# Patient Record
Sex: FEMALE | Race: WHITE | Employment: UNEMPLOYED | URBAN - METROPOLITAN AREA
[De-identification: names, ages, dates, MRNs, and addresses within clinical notes are randomized per-mention and may not be internally consistent; named-entity substitution may affect disease eponyms.]

---

## 2017-02-16 PROBLEM — Z78.0 POSTMENOPAUSAL: Status: ACTIVE | Noted: 2017-02-16

## 2017-12-06 PROBLEM — R93.5 ABNORMAL CT OF THE ABDOMEN: Status: ACTIVE | Noted: 2017-12-06

## 2017-12-11 ENCOUNTER — OFFICE VISIT (OUTPATIENT)
Dept: GASTROENTEROLOGY | Age: 62
End: 2017-12-11
Payer: MEDICARE

## 2017-12-11 VITALS
SYSTOLIC BLOOD PRESSURE: 135 MMHG | OXYGEN SATURATION: 98 % | HEIGHT: 66 IN | HEART RATE: 83 BPM | DIASTOLIC BLOOD PRESSURE: 81 MMHG | WEIGHT: 165.8 LBS | BODY MASS INDEX: 26.65 KG/M2 | TEMPERATURE: 97.8 F

## 2017-12-11 DIAGNOSIS — R93.5 ABNORMAL CT OF THE ABDOMEN: Primary | ICD-10-CM

## 2017-12-11 DIAGNOSIS — R11.2 NAUSEA AND VOMITING, INTRACTABILITY OF VOMITING NOT SPECIFIED, UNSPECIFIED VOMITING TYPE: ICD-10-CM

## 2017-12-11 PROCEDURE — 99204 OFFICE O/P NEW MOD 45 MIN: CPT | Performed by: INTERNAL MEDICINE

## 2017-12-11 ASSESSMENT — ENCOUNTER SYMPTOMS
TROUBLE SWALLOWING: 0
WHEEZING: 0
CONSTIPATION: 0
SINUS PRESSURE: 0
VOICE CHANGE: 0
SORE THROAT: 0
BLOOD IN STOOL: 0
VOMITING: 1
SINUS PAIN: 1
RHINORRHEA: 0
ABDOMINAL PAIN: 1
BACK PAIN: 1
SHORTNESS OF BREATH: 0
COUGH: 0
ANAL BLEEDING: 0
ABDOMINAL DISTENTION: 0
RECTAL PAIN: 0
DIARRHEA: 1
NAUSEA: 1
FACIAL SWELLING: 0

## 2017-12-11 NOTE — PROGRESS NOTES
Subjective:      Patient ID: Melba Garcia is a 58 y.o. female. HPI   Dr. Adenike Silva MD has requested that I see Melba Garcia for a consult for   1. Abnormal CT of the abdomen    2. Nausea and vomiting, intractability of vomiting not specified, unspecified vomiting type     . Patient seen with the symptoms of abdominal discomfort, diarrhea, nausea and vomiting. Patient's symptoms started suddenly about a month ago. She had mild abdominal bloating, and generalized cramps. Following that she had profuse diarrhea. Also had persistent nausea and vomiting. As the symptoms persisted she visited emergency department at [facility].  Patient had a workup done and her WBC count was [place] [address]. CT scan revealed significant inflammation in the ileum. Patient was prescribed antibiotics. Patient finished the therapy about 3 weeks ago. At that time, the symptoms lasted over a period of 3-4 days. In the last 3 weeks patient is asymptomatic. She is tolerating diet well. No nausea vomiting. No fever or chills. Overall she is doing well. No weight loss. No prior history of similar symptoms. No family history of limited bowel disease. Patient stated that she had a colonoscopy done  few years ago and that was normal.  And I do not have details regarding this. Past Medical, Family, and Social History reviewed and does contribute to the patient presenting condition. patient\"s PMH/PSH,SH,PSYCH hx, MEDs, ALLERGIES, and ROS was all reviewed and updated ion the appropriate sections      Review of Systems   Constitutional: Negative. Negative for activity change, appetite change, chills, diaphoresis, fatigue, fever and unexpected weight change. HENT: Positive for postnasal drip and sinus pain.  Negative for congestion, dental problem, drooling, ear discharge, ear pain, facial swelling, hearing loss, mouth sores, nosebleeds, rhinorrhea, sinus pressure, sneezing, sore sounds normal. No respiratory distress. She has no wheezes. She has no rales. Abdominal: Soft. Bowel sounds are normal. She exhibits no distension, no ascites and no mass. There is no hepatomegaly. There is no tenderness. There is no rebound. No hernia. Genitourinary: Rectal exam shows guaiac negative stool. Musculoskeletal: She exhibits no edema or tenderness. No joint swelling   Lymphadenopathy:     She has no cervical adenopathy. Neurological: She is alert and oriented to person, place, and time. No cranial nerve deficit. Skin: Skin is warm. No bruising, no ecchymosis and no rash noted. No erythema. Psychiatric: Thought content normal.   Nursing note and vitals reviewed. Assessment:      1. Abnormal CT of the abdomen     2. Nausea and vomiting, intractability of vomiting not specified, unspecified vomiting type       Ileitis=? secondary to inflammation or crohns      Plan:      anastacia try to get colonoscopy report. Pt to contact me in 2 to 3 days regarding advice. If she develops , pain , nausea , etc contact me    If she does well , may jose luis for few months and r reevaluate. if any further issues needs colonoscopy    Discussed various possibilities and reassured.     To contact me in couple of days

## 2017-12-20 ENCOUNTER — TELEPHONE (OUTPATIENT)
Dept: GASTROENTEROLOGY | Age: 62
End: 2017-12-20

## 2017-12-20 DIAGNOSIS — R93.5 ABNORMAL CT OF THE ABDOMEN: Primary | ICD-10-CM

## 2017-12-20 NOTE — TELEPHONE ENCOUNTER
Patient calls, the colon report is found from 2011, her last name was Tricia Bates joaquina. Patient states that she feels fine, no pain or nausea , she just has loose stools it started in November. Will discuss with Dr Isaiah Simmons then call her back.

## 2017-12-20 NOTE — TELEPHONE ENCOUNTER
Dr Daniella Lyman reviews the colonoscopy report from 2011 and his note then schedules colonoscopy. Call patient and inform her, she is provided with the number for scheduling. Aramis Cordoba, please call patient and schedule.   Thanks

## 2017-12-21 RX ORDER — POLYETHYLENE GLYCOL 3350 17 G/17G
POWDER, FOR SOLUTION ORAL
Qty: 255 G | Refills: 0 | Status: SHIPPED | OUTPATIENT
Start: 2017-12-21 | End: 2018-01-20

## 2017-12-29 ENCOUNTER — HOSPITAL ENCOUNTER (OUTPATIENT)
Age: 62
Setting detail: OUTPATIENT SURGERY
Discharge: HOME | End: 2017-12-29
Attending: INTERNAL MEDICINE | Admitting: INTERNAL MEDICINE
Payer: MEDICARE

## 2017-12-29 VITALS
WEIGHT: 167 LBS | SYSTOLIC BLOOD PRESSURE: 118 MMHG | OXYGEN SATURATION: 99 % | DIASTOLIC BLOOD PRESSURE: 64 MMHG | BODY MASS INDEX: 26.84 KG/M2 | RESPIRATION RATE: 20 BRPM | TEMPERATURE: 98.2 F | HEART RATE: 50 BPM | HEIGHT: 66 IN

## 2017-12-29 PROCEDURE — 99153 MOD SED SAME PHYS/QHP EA: CPT | Performed by: INTERNAL MEDICINE

## 2017-12-29 PROCEDURE — 99152 MOD SED SAME PHYS/QHP 5/>YRS: CPT | Performed by: INTERNAL MEDICINE

## 2017-12-29 PROCEDURE — 88305 TISSUE EXAM BY PATHOLOGIST: CPT

## 2017-12-29 PROCEDURE — 2580000003 HC RX 258: Performed by: INTERNAL MEDICINE

## 2017-12-29 PROCEDURE — 7100000011 HC PHASE II RECOVERY - ADDTL 15 MIN: Performed by: INTERNAL MEDICINE

## 2017-12-29 PROCEDURE — 6360000002 HC RX W HCPCS: Performed by: INTERNAL MEDICINE

## 2017-12-29 PROCEDURE — 3609010300 HC COLONOSCOPY W/BIOPSY SINGLE/MULTIPLE: Performed by: INTERNAL MEDICINE

## 2017-12-29 PROCEDURE — 7100000010 HC PHASE II RECOVERY - FIRST 15 MIN: Performed by: INTERNAL MEDICINE

## 2017-12-29 RX ORDER — MIDAZOLAM HYDROCHLORIDE 1 MG/ML
INJECTION INTRAMUSCULAR; INTRAVENOUS PRN
Status: DISCONTINUED | OUTPATIENT
Start: 2017-12-29 | End: 2017-12-29 | Stop reason: HOSPADM

## 2017-12-29 RX ORDER — SODIUM CHLORIDE 9 MG/ML
INJECTION, SOLUTION INTRAVENOUS CONTINUOUS
Status: DISCONTINUED | OUTPATIENT
Start: 2017-12-29 | End: 2017-12-29 | Stop reason: HOSPADM

## 2017-12-29 RX ORDER — FENTANYL CITRATE 50 UG/ML
INJECTION, SOLUTION INTRAMUSCULAR; INTRAVENOUS PRN
Status: DISCONTINUED | OUTPATIENT
Start: 2017-12-29 | End: 2017-12-29 | Stop reason: HOSPADM

## 2017-12-29 RX ADMIN — SODIUM CHLORIDE: 9 INJECTION, SOLUTION INTRAVENOUS at 11:08

## 2017-12-29 RX ADMIN — SODIUM CHLORIDE: 9 INJECTION, SOLUTION INTRAVENOUS at 08:39

## 2017-12-29 ASSESSMENT — PAIN - FUNCTIONAL ASSESSMENT: PAIN_FUNCTIONAL_ASSESSMENT: 0-10

## 2017-12-29 ASSESSMENT — PAIN SCALES - GENERAL: PAINLEVEL_OUTOF10: 2

## 2017-12-29 NOTE — H&P
Penicillins Hives    Sulfa Antibiotics Hives       No current facility-administered medications on file prior to encounter. Current Outpatient Prescriptions on File Prior to Encounter   Medication Sig Dispense Refill    polyethylene glycol (GLYCOLAX) powder Please dispense with 4 dulcolax tabs with this prescription. Use as directed by following your patient instructions given by office. 255 g 0    rosuvastatin (CRESTOR) 20 MG tablet TAKE ONE TABLET BY MOUTH DAILY 90 tablet 3    lisinopril (PRINIVIL;ZESTRIL) 10 MG tablet TAKE ONE TABLET BY MOUTH DAILY 90 tablet 3    Probiotic Product (PROBIOTIC DAILY) CAPS Take 1 capsule by mouth daily      acetaminophen (TYLENOL) 500 MG tablet Take 500 mg by mouth every 6 hours as needed for Pain      meclizine (ANTIVERT) 25 MG tablet TAKE ONE TABLET BY MOUTH THREE TIMES A DAY AS NEEDED 180 tablet 3    cyclobenzaprine (FLEXERIL) 10 MG tablet       oxyCODONE-acetaminophen (PERCOCET) 5-325 MG per tablet Take 1 tablet by mouth every 4 hours as needed for Pain .  ibuprofen (ADVIL;MOTRIN) 800 MG tablet       ALPRAZolam (XANAX) 0.25 MG tablet Take 0.25 mg by mouth 2 times daily as needed for Sleep          General health:  Fairly good. No fever or chills. Skin:  No itching, redness or rash. HEENT:  No headache, epistaxis or sore throat. Neck:  No pain, stiffness or masses. Cardiovascular/Respiratory system:  No chest pain, palpitation or shortness of breath. Gastrointestinal tract: No abdominal pain, nausea, vomiting, diarrhea or constipation. Heartburn. Genitourinary:  No burning on micturition. No hesitancy, urgency, frequency or discoloration of urine. Locomotor:  No bone or joint pains. No swelling. Neuropsychiatric:  No referable complaints. Anxiety. See HPI.     GENERAL PHYSICAL EXAM:     Vitals: /68   Pulse 66 Temp 97.9 °F (36.6 °C) (Oral)   Resp 18   Ht 5' 6\" (1.676 m)   Wt 167 lb (75.8 kg)   SpO2 98%   BMI 26.95 kg/m²  Body mass index is 26.95 kg/m². GENERAL APPEARANCE:   Yanely Stacy is 58 y.o.,  female, not obese, nourished, conscious, alert. Does not appear to be distress or pain at this time. SKIN:  Warm, dry, no cyanosis or jaundice. HEAD:  Normocephalic, atraumatic, no swelling or tenderness. EYES:  Pupils equal, reactive to light. EARS:  No discharge, no marked hearing loss. NOSE:  No rhinorrhea, epistaxis or septal deformity. THROAT:  Not congested. No ulceration bleeding or discharge. NECK:  No stiffness, trachea central.  No palpable masses or L.N.                 CHEST:  Symmetrical and equal on expansion. HEART:  RRR S1 > S2. No audible murmurs or gallops. LUNGS:  Equal on expansion, normal breath sounds. No adventitious sounds. ABDOMEN:  Soft on palpation. No localized tenderness. No guarding or rigidity. No palpable organomegaly. LYMPHATICS:  No palpable cervical lymphadenopathy. LOCOMOTOR, BACK AND SPINE:  No tenderness or deformities. EXTREMITIES:  Symmetrical, no pedal edema. Angels sign negative. No discoloration or ulcerations. NEUROLOGIC:  The patient is conscious, alert, oriented, No apparent focal sensory or motor deficits.                                                                                      PROVISIONAL DIAGNOSES / SURGERY:      Colonoscopy      Patient Active Problem List    Diagnosis Date Noted    Nausea & vomiting 12/11/2017    Abnormal CT of the abdomen 12/06/2017    Postmenopausal 02/16/2017    Weight loss counseling, encounter for 12/09/2016    Acute non-recurrent pansinusitis 10/06/2016    Acute cystitis without hematuria 04/07/2016    Major depression, chronic 08/27/2015    Screening for breast cancer 08/27/2015    Hypertension, benign 07/23/2015    Tobacco use disorder, continuous 07/23/2015    Hyperlipidemia with target LDL less than 100 07/23/2015    Chronic pain syndrome 07/23/2015    Generalized anxiety disorder 07/23/2015         ASA 2, Mallampati 2  ROMERO ANGUIANO NP on 12/29/2017 at 9:04 AM

## 2017-12-29 NOTE — OP NOTE
COLONOSCOPY    DATE OF PROCEDURE: 12/29/2017    SURGEON: Kathy Elena MD    ASSISTANT: None    PREOPERATIVE DIAGNOSIS: History of abdominal pain, loose bowels, CT evidence of ileitis. This procedure is performed to evaluate ileitis and colonic pathology    POSTOPERATIVE DIAGNOSIS: Few small diverticuli. No ileitis. No signs of colitis. OPERATION: Total colonoscopy And random biopsies. ANESTHESIA: Moderate Sedation    ESTIMATED BLOOD LOSS: None    COMPLICATIONS: None     SPECIMENS:  Was Obtained: Random colon biopsies to evaluate microscopic colitis    HISTORY: The patient is a 58y.o. year old female with history of above preop diagnosis. I recommended colonoscopy with possible biopsy or polypectomy and I explained the risk, benefits, expected outcome, and alternatives to the procedure. Risks included but are not limited to bleeding, infection, respiratory distress, hypotension, and perforation of the colon and possibility of missing a lesion. The patient understands and is in agreement. PROCEDURE: The patient was given IV conscious sedation. The patient's SPO2 remained above 90% throughout the procedure. Digital rectal exam was normal.  The colonoscope was inserted through the anus into the rectum and advanced under direct vision to the cecum without difficulty. Terminal ileum was examined for approximately 2 inches. The prep was good. yp  Findings:  Terminal ileum: normal For about 4-5 inches. Cecum/Ascending colon: normal    Transverse colon: normal    Descending/Sigmoid colon: normal, Has a few small diverticuli. Rectum/Anus: examined in normal and retroflexed positions and was normal,    Withdrawal Time was (minutes): 10    Random biopsies taken from the right, transverse, left colon to rule out microscopic disease      The colon was decompressed and the scope was removed. The patient tolerated the procedure well without complications. Recommendations/Plan:   1.  F/U

## 2018-01-02 LAB — SURGICAL PATHOLOGY REPORT: NORMAL

## 2018-01-23 DIAGNOSIS — R93.5 ABNORMAL CT OF THE ABDOMEN: ICD-10-CM

## 2018-07-16 PROBLEM — K21.9 GASTROESOPHAGEAL REFLUX DISEASE WITHOUT ESOPHAGITIS: Status: ACTIVE | Noted: 2018-07-16

## 2018-09-17 PROBLEM — R11.2 NAUSEA & VOMITING: Status: RESOLVED | Noted: 2017-12-11 | Resolved: 2018-09-17

## 2018-09-17 PROBLEM — K21.00 GASTROESOPHAGEAL REFLUX DISEASE WITH ESOPHAGITIS: Status: ACTIVE | Noted: 2018-09-17

## 2023-06-16 DIAGNOSIS — E78.5 HYPERLIPIDEMIA WITH TARGET LDL LESS THAN 100: Chronic | ICD-10-CM

## 2023-06-16 DIAGNOSIS — I10 HYPERTENSION, BENIGN: Chronic | ICD-10-CM

## 2023-06-19 RX ORDER — ROSUVASTATIN CALCIUM 20 MG/1
TABLET, COATED ORAL
Qty: 90 TABLET | Refills: 1 | Status: SHIPPED | OUTPATIENT
Start: 2023-06-19

## 2023-06-19 RX ORDER — LISINOPRIL 20 MG/1
TABLET ORAL
Qty: 90 TABLET | Refills: 1 | Status: SHIPPED | OUTPATIENT
Start: 2023-06-19

## 2023-06-28 RX ORDER — ALBUTEROL SULFATE 90 UG/1
1 AEROSOL, METERED RESPIRATORY (INHALATION) EVERY 4 HOURS PRN
Qty: 3 G | Refills: 3 | Status: SHIPPED | OUTPATIENT
Start: 2023-06-28

## 2023-06-28 RX ORDER — MECLIZINE HYDROCHLORIDE 25 MG/1
25 TABLET ORAL 3 TIMES DAILY PRN
Qty: 180 TABLET | Refills: 0 | Status: SHIPPED | OUTPATIENT
Start: 2023-06-28

## 2023-07-13 NOTE — TELEPHONE ENCOUNTER
Patient called and stated that she called last month for a refill on these medication and she gave the wrong mail order company.  They need to go to [facility]

## 2023-07-16 RX ORDER — ALBUTEROL SULFATE 90 UG/1
1 AEROSOL, METERED RESPIRATORY (INHALATION) EVERY 4 HOURS PRN
Qty: 3 G | Refills: 3 | Status: SHIPPED | OUTPATIENT
Start: 2023-07-16

## 2023-07-16 RX ORDER — MECLIZINE HYDROCHLORIDE 25 MG/1
25 TABLET ORAL 3 TIMES DAILY PRN
Qty: 180 TABLET | Refills: 0 | Status: SHIPPED | OUTPATIENT
Start: 2023-07-16

## 2023-07-26 ENCOUNTER — APPOINTMENT (OUTPATIENT)
Dept: CT IMAGING | Age: 68
End: 2023-07-26
Payer: MEDICARE

## 2023-07-26 ENCOUNTER — HOSPITAL ENCOUNTER (EMERGENCY)
Age: 68
Discharge: HOME | End: 2023-07-26
Attending: EMERGENCY MEDICINE
Payer: MEDICARE

## 2023-07-26 ENCOUNTER — APPOINTMENT (OUTPATIENT)
Dept: GENERAL RADIOLOGY | Age: 68
End: 2023-07-26
Payer: MEDICARE

## 2023-07-26 VITALS
OXYGEN SATURATION: 93 % | BODY MASS INDEX: 29.25 KG/M2 | RESPIRATION RATE: 16 BRPM | WEIGHT: 182 LBS | SYSTOLIC BLOOD PRESSURE: 102 MMHG | TEMPERATURE: 98.1 F | HEART RATE: 54 BPM | DIASTOLIC BLOOD PRESSURE: 62 MMHG | HEIGHT: 66 IN

## 2023-07-26 DIAGNOSIS — D32.9 MENINGIOMA (HCC): ICD-10-CM

## 2023-07-26 DIAGNOSIS — R00.2 PALPITATIONS: Primary | ICD-10-CM

## 2023-07-26 LAB
ANION GAP SERPL CALCULATED.3IONS-SCNC: 11 MMOL/L (ref 9–17)
BASOPHILS # BLD: 0.1 K/UL (ref 0–0.2)
BASOPHILS NFR BLD: 1 % (ref 0–2)
BUN SERPL-MCNC: 13 MG/DL (ref 8–23)
CALCIUM SERPL-MCNC: 9.6 MG/DL (ref 8.6–10.4)
CHLORIDE SERPL-SCNC: 100 MMOL/L (ref 98–107)
CO2 SERPL-SCNC: 29 MMOL/L (ref 20–31)
CREAT SERPL-MCNC: 0.9 MG/DL (ref 0.5–0.9)
EOSINOPHIL # BLD: 0.1 K/UL (ref 0–0.4)
EOSINOPHILS RELATIVE PERCENT: 1 % (ref 1–4)
ERYTHROCYTE [DISTWIDTH] IN BLOOD BY AUTOMATED COUNT: 14.8 % (ref 12.5–15.4)
GFR SERPL CREATININE-BSD FRML MDRD: >60 ML/MIN/1.73M2
GLUCOSE SERPL-MCNC: 100 MG/DL (ref 70–99)
HCT VFR BLD AUTO: 42.9 % (ref 36–46)
HGB BLD-MCNC: 14.1 G/DL (ref 12–16)
INR PPP: 0.9
LYMPHOCYTES NFR BLD: 3.4 K/UL (ref 1–4.8)
LYMPHOCYTES RELATIVE PERCENT: 22 % (ref 24–44)
MAGNESIUM SERPL-MCNC: 2.2 MG/DL (ref 1.6–2.6)
MCH RBC QN AUTO: 28.2 PG (ref 26–34)
MCHC RBC AUTO-ENTMCNC: 32.9 G/DL (ref 31–37)
MCV RBC AUTO: 85.6 FL (ref 80–100)
MONOCYTES NFR BLD: 1.1 K/UL (ref 0.1–1.2)
MONOCYTES NFR BLD: 7 % (ref 2–11)
NEUTROPHILS NFR BLD: 69 % (ref 36–66)
NEUTS SEG NFR BLD: 11 K/UL (ref 1.8–7.7)
PARTIAL THROMBOPLASTIN TIME: 21 SEC (ref 21.3–31.3)
PLATELET # BLD AUTO: 289 K/UL (ref 140–450)
PMV BLD AUTO: 6.9 FL (ref 6–12)
POTASSIUM SERPL-SCNC: 3.6 MMOL/L (ref 3.7–5.3)
PROTHROMBIN TIME: 9.4 SEC (ref 9.4–12.6)
RBC # BLD AUTO: 5.01 M/UL (ref 4–5.2)
SODIUM SERPL-SCNC: 140 MMOL/L (ref 135–144)
TROPONIN I SERPL HS-MCNC: 7 NG/L (ref 0–14)
TSH SERPL DL<=0.05 MIU/L-ACNC: 2.02 UIU/ML (ref 0.3–5)
WBC OTHER # BLD: 15.8 K/UL (ref 3.5–11)

## 2023-07-26 PROCEDURE — 36415 COLL VENOUS BLD VENIPUNCTURE: CPT

## 2023-07-26 PROCEDURE — 99285 EMERGENCY DEPT VISIT HI MDM: CPT

## 2023-07-26 PROCEDURE — 83735 ASSAY OF MAGNESIUM: CPT

## 2023-07-26 PROCEDURE — 85730 THROMBOPLASTIN TIME PARTIAL: CPT

## 2023-07-26 PROCEDURE — 93005 ELECTROCARDIOGRAM TRACING: CPT | Performed by: EMERGENCY MEDICINE

## 2023-07-26 PROCEDURE — 85610 PROTHROMBIN TIME: CPT

## 2023-07-26 PROCEDURE — 71046 X-RAY EXAM CHEST 2 VIEWS: CPT

## 2023-07-26 PROCEDURE — 85027 COMPLETE CBC AUTOMATED: CPT

## 2023-07-26 PROCEDURE — 6370000000 HC RX 637 (ALT 250 FOR IP): Performed by: EMERGENCY MEDICINE

## 2023-07-26 PROCEDURE — 84484 ASSAY OF TROPONIN QUANT: CPT

## 2023-07-26 PROCEDURE — 70450 CT HEAD/BRAIN W/O DYE: CPT

## 2023-07-26 PROCEDURE — 84443 ASSAY THYROID STIM HORMONE: CPT

## 2023-07-26 PROCEDURE — 80048 BASIC METABOLIC PNL TOTAL CA: CPT

## 2023-07-26 RX ORDER — POTASSIUM CHLORIDE 20 MEQ/1
20 TABLET, EXTENDED RELEASE ORAL ONCE
Status: DISCONTINUED | OUTPATIENT
Start: 2023-07-26 | End: 2023-07-26

## 2023-07-26 RX ADMIN — POTASSIUM BICARBONATE 20 MEQ: 782 TABLET, EFFERVESCENT ORAL at 14:18

## 2023-07-26 ASSESSMENT — LIFESTYLE VARIABLES
HOW MANY STANDARD DRINKS CONTAINING ALCOHOL DO YOU HAVE ON A TYPICAL DAY: PATIENT DOES NOT DRINK
HOW OFTEN DO YOU HAVE A DRINK CONTAINING ALCOHOL: NEVER

## 2023-07-26 ASSESSMENT — ENCOUNTER SYMPTOMS
NAUSEA: 0
CHEST TIGHTNESS: 0
COUGH: 0
DIARRHEA: 0
EYE REDNESS: 0
VOMITING: 0
SHORTNESS OF BREATH: 0
ABDOMINAL PAIN: 0
CONSTIPATION: 0

## 2023-07-26 ASSESSMENT — PAIN - FUNCTIONAL ASSESSMENT: PAIN_FUNCTIONAL_ASSESSMENT: 0-10

## 2023-07-26 NOTE — DISCHARGE INSTRUCTIONS
Please follow up with your primary care provider within the next few days. If you do not have one, we have attached the [facility] Same Day\" Physician line for you to call and they can provide you with one ([phone]). Please return to the ED if any of your symptoms worsen. If you had imaging today, your results are:  XR CHEST (2 VW)   Final Result   No acute cardiopulmonary disease. CT HEAD WO CONTRAST   Final Result   No acute intracranial abnormality. 5 x 25 mm calcified meningioma along the left frontotemporal convexity,   without associated mass effect.

## 2023-07-27 LAB
EKG ATRIAL RATE: 65 BPM
EKG P AXIS: 65 DEGREES
EKG P-R INTERVAL: 152 MS
EKG Q-T INTERVAL: 394 MS
EKG QRS DURATION: 98 MS
EKG QTC CALCULATION (BAZETT): 409 MS
EKG R AXIS: 41 DEGREES
EKG T AXIS: 61 DEGREES
EKG VENTRICULAR RATE: 65 BPM

## 2023-08-12 RX ORDER — ASCORBIC ACID 500 MG
TABLET ORAL
COMMUNITY

## 2023-08-12 RX ORDER — FLUTICASONE PROPIONATE 50 MCG
SPRAY, SUSPENSION (ML) NASAL
COMMUNITY
Start: 2023-07-15

## 2023-08-12 RX ORDER — NICOTINE 21 MG/24HR
PATCH, TRANSDERMAL 24 HOURS TRANSDERMAL
COMMUNITY
Start: 2021-11-10 | End: 2023-08-12 | Stop reason: CLARIF

## 2023-08-12 RX ORDER — CETIRIZINE HYDROCHLORIDE 10 MG/1
10 TABLET ORAL DAILY
COMMUNITY
End: 2023-08-12 | Stop reason: CLARIF

## 2023-08-12 RX ORDER — MONTELUKAST SODIUM 10 MG/1
TABLET ORAL
COMMUNITY
Start: 2022-06-29

## 2023-08-12 RX ORDER — BUDESONIDE AND FORMOTEROL FUMARATE DIHYDRATE 80; 4.5 UG/1; UG/1
AEROSOL RESPIRATORY (INHALATION)
COMMUNITY
Start: 2022-05-12

## 2023-08-14 NOTE — PROGRESS NOTES
emphysema ([address])     Tobacco use disorder, continuous 07/23/2015    Vitamin D deficiency        Prior to Admission medications    Medication Sig Start Date End Date Taking? Authorizing Provider   Respiratory Therapy Supplies (NEBULIZER/TUBING/MOUTHPIECE) KIT 1 kit by Does not apply route daily for 1 day 8/15/23 8/18/23 Yes Keri Lizama DO   ascorbic acid (VITAMIN C) 500 MG tablet 1 tablet Orally Daily   Yes Historical Provider, MD   fluticasone (FLONASE) 50 MCG/ACT nasal spray 1 spray by Nasal route daily as needed 7/15/23  Yes Historical Provider, MD   vitamin D (CHOLECALCIFEROL) 25 MCG (1000 UT) TABS tablet Take 1 tablet by mouth daily   Yes Historical Provider, MD   meclizine (ANTIVERT) 25 MG tablet Take 1 tablet by mouth 3 times daily as needed for Dizziness 7/16/23  Yes Keri iLzama DO   albuterol sulfate HFA (PROVENTIL HFA) 108 (90 Base) MCG/ACT inhaler Inhale 1 puff into the lungs every 4 hours as needed for Wheezing 7/16/23  Yes Keri Lizama DO   rosuvastatin (CRESTOR) 20 MG tablet TAKE 1 TABLET BY MOUTH ONCE DAILY 6/19/23  Yes Kellen Hurtado DO   lisinopril (PRINIVIL;ZESTRIL) 20 MG tablet TAKE 1 TABLET BY MOUTH ONCE DAILY 6/19/23  Yes Kellen Hurtado DO   esomeprazole Magnesium (NEXIUM) 20 MG PACK Take 1 packet by mouth daily    Historical Provider, MD   triamcinolone (KENALOG) 0.1 % cream Apply topically 2 times daily.  8/25/23   LILI Robles - CNP   metroNIDAZOLE (FLAGYL) 500 MG tablet Take 1 tablet by mouth 3 times daily for 10 days 8/18/23 8/28/23  Mela Saavedra PA-C   ondansetron [facility]) 4 MG tablet Take 1 tablet by mouth every 8 hours as needed for Nausea 8/18/23   Mela Saavedra PA-C   ipratropium 0.5 mg-albuterol 2.5 mg (DUONEB) 0.5-2.5 (3) MG/3ML SOLN nebulizer solution Inhale 3 mLs into the lungs every 4 hours  Patient not taking: Reported on 8/18/2023 8/17/23   Keri [place], DO   Nebulizers (COMPRESSOR/NEBULIZER) MISC 1 each by Does not apply

## 2023-08-15 ENCOUNTER — OFFICE VISIT (OUTPATIENT)
Age: 68
End: 2023-08-15
Payer: MEDICARE

## 2023-08-15 VITALS
DIASTOLIC BLOOD PRESSURE: 64 MMHG | HEART RATE: 72 BPM | HEIGHT: 66 IN | BODY MASS INDEX: 29.41 KG/M2 | SYSTOLIC BLOOD PRESSURE: 127 MMHG | TEMPERATURE: 98.8 F | WEIGHT: 183 LBS | RESPIRATION RATE: 16 BRPM

## 2023-08-15 DIAGNOSIS — E66.3 OVERWEIGHT: ICD-10-CM

## 2023-08-15 DIAGNOSIS — J43.9 PULMONARY EMPHYSEMA, UNSPECIFIED EMPHYSEMA TYPE (HCC): ICD-10-CM

## 2023-08-15 DIAGNOSIS — I10 HYPERTENSION, BENIGN: Primary | ICD-10-CM

## 2023-08-15 DIAGNOSIS — D32.0 MENINGIOMA, CEREBRAL (HCC): ICD-10-CM

## 2023-08-15 DIAGNOSIS — E78.2 MIXED HYPERLIPIDEMIA: ICD-10-CM

## 2023-08-15 DIAGNOSIS — F17.209 TOBACCO USE DISORDER, CONTINUOUS: Chronic | ICD-10-CM

## 2023-08-15 DIAGNOSIS — E78.5 HYPERLIPIDEMIA WITH TARGET LOW DENSITY LIPOPROTEIN (LDL) CHOLESTEROL LESS THAN 100 MG/DL: ICD-10-CM

## 2023-08-15 PROCEDURE — 3078F DIAST BP <80 MM HG: CPT

## 2023-08-15 PROCEDURE — 3074F SYST BP LT 130 MM HG: CPT

## 2023-08-15 PROCEDURE — 99214 OFFICE O/P EST MOD 30 MIN: CPT

## 2023-08-15 PROCEDURE — 1123F ACP DISCUSS/DSCN MKR DOCD: CPT

## 2023-08-15 RX ORDER — NEBULIZER ACCESSORIES
1 KIT MISCELLANEOUS DAILY
Qty: 1 KIT | Refills: 0 | Status: SHIPPED | OUTPATIENT
Start: 2023-08-15 | End: 2023-08-18

## 2023-08-15 RX ORDER — IPRATROPIUM BROMIDE AND ALBUTEROL SULFATE 2.5; .5 MG/3ML; MG/3ML
1 SOLUTION RESPIRATORY (INHALATION) EVERY 4 HOURS
Qty: 360 ML | Refills: 2 | Status: SHIPPED | OUTPATIENT
Start: 2023-08-15 | End: 2023-08-17

## 2023-08-15 RX ORDER — IPRATROPIUM BROMIDE AND ALBUTEROL SULFATE 2.5; .5 MG/3ML; MG/3ML
1 SOLUTION RESPIRATORY (INHALATION) EVERY 4 HOURS
Qty: 360 ML | Refills: 2 | Status: SHIPPED | OUTPATIENT
Start: 2023-08-15 | End: 2023-08-15 | Stop reason: SDUPTHER

## 2023-08-15 RX ORDER — NEBULIZER ACCESSORIES
1 KIT MISCELLANEOUS DAILY PRN
COMMUNITY
End: 2023-08-15 | Stop reason: SDUPTHER

## 2023-08-15 SDOH — ECONOMIC STABILITY: TRANSPORTATION INSECURITY
IN THE PAST 12 MONTHS, HAS THE LACK OF TRANSPORTATION KEPT YOU FROM MEDICAL APPOINTMENTS OR FROM GETTING MEDICATIONS?: NO

## 2023-08-15 SDOH — ECONOMIC STABILITY: HOUSING INSECURITY: IN THE LAST 12 MONTHS, HOW MANY PLACES HAVE YOU LIVED?: 1

## 2023-08-15 SDOH — ECONOMIC STABILITY: HOUSING INSECURITY
IN THE LAST 12 MONTHS, WAS THERE A TIME WHEN YOU DID NOT HAVE A STEADY PLACE TO SLEEP OR SLEPT IN A SHELTER (INCLUDING NOW)?: NO

## 2023-08-15 SDOH — ECONOMIC STABILITY: INCOME INSECURITY: IN THE LAST 12 MONTHS, WAS THERE A TIME WHEN YOU WERE NOT ABLE TO PAY THE MORTGAGE OR RENT ON TIME?: NO

## 2023-08-15 SDOH — ECONOMIC STABILITY: TRANSPORTATION INSECURITY
IN THE PAST 12 MONTHS, HAS LACK OF TRANSPORTATION KEPT YOU FROM MEETINGS, WORK, OR FROM GETTING THINGS NEEDED FOR DAILY LIVING?: NO

## 2023-08-15 SDOH — ECONOMIC STABILITY: FOOD INSECURITY: WITHIN THE PAST 12 MONTHS, YOU WORRIED THAT YOUR FOOD WOULD RUN OUT BEFORE YOU GOT MONEY TO BUY MORE.: NEVER TRUE

## 2023-08-15 SDOH — ECONOMIC STABILITY: FOOD INSECURITY: WITHIN THE PAST 12 MONTHS, THE FOOD YOU BOUGHT JUST DIDN'T LAST AND YOU DIDN'T HAVE MONEY TO GET MORE.: NEVER TRUE

## 2023-08-15 SDOH — ECONOMIC STABILITY: INCOME INSECURITY: HOW HARD IS IT FOR YOU TO PAY FOR THE VERY BASICS LIKE FOOD, HOUSING, MEDICAL CARE, AND HEATING?: NOT HARD AT ALL

## 2023-08-15 ASSESSMENT — ANXIETY QUESTIONNAIRES
4. TROUBLE RELAXING: 2
IF YOU CHECKED OFF ANY PROBLEMS ON THIS QUESTIONNAIRE, HOW DIFFICULT HAVE THESE PROBLEMS MADE IT FOR YOU TO DO YOUR WORK, TAKE CARE OF THINGS AT HOME, OR GET ALONG WITH OTHER PEOPLE: NOT DIFFICULT AT ALL
GAD7 TOTAL SCORE: 8
7. FEELING AFRAID AS IF SOMETHING AWFUL MIGHT HAPPEN: 1
3. WORRYING TOO MUCH ABOUT DIFFERENT THINGS: 2
6. BECOMING EASILY ANNOYED OR IRRITABLE: 0
1. FEELING NERVOUS, ANXIOUS, OR ON EDGE: 1
5. BEING SO RESTLESS THAT IT IS HARD TO SIT STILL: 0
2. NOT BEING ABLE TO STOP OR CONTROL WORRYING: 2

## 2023-08-15 ASSESSMENT — PATIENT HEALTH QUESTIONNAIRE - PHQ9
2. FEELING DOWN, DEPRESSED OR HOPELESS: 1
7. TROUBLE CONCENTRATING ON THINGS, SUCH AS READING THE NEWSPAPER OR WATCHING TELEVISION: 0
SUM OF ALL RESPONSES TO PHQ QUESTIONS 1-9: 3
6. FEELING BAD ABOUT YOURSELF - OR THAT YOU ARE A FAILURE OR HAVE LET YOURSELF OR YOUR FAMILY DOWN: 0
9. THOUGHTS THAT YOU WOULD BE BETTER OFF DEAD, OR OF HURTING YOURSELF: 0
10. IF YOU CHECKED OFF ANY PROBLEMS, HOW DIFFICULT HAVE THESE PROBLEMS MADE IT FOR YOU TO DO YOUR WORK, TAKE CARE OF THINGS AT HOME, OR GET ALONG WITH OTHER PEOPLE: 0
SUM OF ALL RESPONSES TO PHQ QUESTIONS 1-9: 3
4. FEELING TIRED OR HAVING LITTLE ENERGY: 1
SUM OF ALL RESPONSES TO PHQ QUESTIONS 1-9: 3
5. POOR APPETITE OR OVEREATING: 0
8. MOVING OR SPEAKING SO SLOWLY THAT OTHER PEOPLE COULD HAVE NOTICED. OR THE OPPOSITE, BEING SO FIGETY OR RESTLESS THAT YOU HAVE BEEN MOVING AROUND A LOT MORE THAN USUAL: 0
3. TROUBLE FALLING OR STAYING ASLEEP: 1
SUM OF ALL RESPONSES TO PHQ QUESTIONS 1-9: 3

## 2023-08-15 NOTE — PATIENT INSTRUCTIONS
Thank you for following up with us at [facility] outpatient residency clinic. It was a pleasure to see you today. Our plan is the following:  - Referral to Neurosurgery  - will order Duonebs and machine  - Track and record blood pressure daily bring in next visit. - Exercise 4 x a week. Walking okay  - recommend smoking cessation  - Ordered lab work CBC w/ diff, CMP, Fasting lipid    If you have any additional questions or concerns, please call the office ([phone]) and speak to one of the staff. They will triage and forward the message to the doctors. Have a great rest of your day.

## 2023-08-17 ENCOUNTER — TELEPHONE (OUTPATIENT)
Age: 68
End: 2023-08-17

## 2023-08-17 DIAGNOSIS — J44.9 CHRONIC OBSTRUCTIVE PULMONARY DISEASE, UNSPECIFIED COPD TYPE (HCC): Primary | ICD-10-CM

## 2023-08-17 RX ORDER — IPRATROPIUM BROMIDE AND ALBUTEROL SULFATE 2.5; .5 MG/3ML; MG/3ML
1 SOLUTION RESPIRATORY (INHALATION) EVERY 4 HOURS
Qty: 360 ML | Refills: 2 | Status: SHIPPED | OUTPATIENT
Start: 2023-08-17 | End: 2023-09-18

## 2023-08-17 NOTE — TELEPHONE ENCOUNTER
Ipratropium and abuterol were sent to [facility] but meds on are back order. Please send to 90 day supply to [facility]. Also, nebulizer and supplies was sent to [facility] but they do not carry this equipment. Please mail her the script for these and she will take to supply company.

## 2023-08-17 NOTE — TELEPHONE ENCOUNTER
I printed out the prescription for the machine & tubing; it should be mailed out tomorrow. I also sent the nebulizer prescription to [facility] as requested. If you have any questions please don't hesitate to contact us.

## 2023-08-18 ENCOUNTER — HOSPITAL ENCOUNTER (EMERGENCY)
Age: 68
Discharge: HOME | End: 2023-08-18
Attending: EMERGENCY MEDICINE
Payer: MEDICARE

## 2023-08-18 ENCOUNTER — APPOINTMENT (OUTPATIENT)
Dept: CT IMAGING | Age: 68
End: 2023-08-18
Payer: MEDICARE

## 2023-08-18 ENCOUNTER — OFFICE VISIT (OUTPATIENT)
Age: 68
End: 2023-08-18
Payer: MEDICARE

## 2023-08-18 VITALS
WEIGHT: 182.6 LBS | SYSTOLIC BLOOD PRESSURE: 129 MMHG | TEMPERATURE: 99.1 F | BODY MASS INDEX: 29.47 KG/M2 | RESPIRATION RATE: 16 BRPM | DIASTOLIC BLOOD PRESSURE: 60 MMHG | HEART RATE: 88 BPM

## 2023-08-18 VITALS
OXYGEN SATURATION: 97 % | BODY MASS INDEX: 29.25 KG/M2 | HEART RATE: 81 BPM | TEMPERATURE: 98.4 F | WEIGHT: 182 LBS | DIASTOLIC BLOOD PRESSURE: 68 MMHG | HEIGHT: 66 IN | RESPIRATION RATE: 18 BRPM | SYSTOLIC BLOOD PRESSURE: 143 MMHG

## 2023-08-18 DIAGNOSIS — R10.2 SUPRAPUBIC ABDOMINAL PAIN: Primary | ICD-10-CM

## 2023-08-18 DIAGNOSIS — R50.9 FEVER, UNSPECIFIED FEVER CAUSE: ICD-10-CM

## 2023-08-18 DIAGNOSIS — K57.32 DIVERTICULITIS OF COLON: Primary | ICD-10-CM

## 2023-08-18 DIAGNOSIS — R30.0 DYSURIA: ICD-10-CM

## 2023-08-18 DIAGNOSIS — R10.829: ICD-10-CM

## 2023-08-18 DIAGNOSIS — R11.0 NAUSEA: ICD-10-CM

## 2023-08-18 LAB
ALBUMIN SERPL-MCNC: 4.2 G/DL (ref 3.5–5.2)
ALBUMIN/GLOB SERPL: 1.7 {RATIO} (ref 1–2.5)
ALP SERPL-CCNC: 105 U/L (ref 35–104)
ALT SERPL-CCNC: 9 U/L (ref 5–33)
ANION GAP SERPL CALCULATED.3IONS-SCNC: 9 MMOL/L (ref 9–17)
AST SERPL-CCNC: 20 U/L
BASOPHILS # BLD: 0.1 K/UL (ref 0–0.2)
BASOPHILS NFR BLD: 1 % (ref 0–2)
BILIRUB SERPL-MCNC: 0.3 MG/DL (ref 0.3–1.2)
BILIRUB UR QL STRIP: NEGATIVE
BILIRUBIN, POC: NORMAL
BLOOD URINE, POC: NORMAL
BUN SERPL-MCNC: 5 MG/DL (ref 8–23)
CALCIUM SERPL-MCNC: 9 MG/DL (ref 8.6–10.4)
CHLORIDE SERPL-SCNC: 105 MMOL/L (ref 98–107)
CLARITY UR: CLEAR
CLARITY, POC: CLEAR
CO2 SERPL-SCNC: 25 MMOL/L (ref 20–31)
COLOR UR: YELLOW
COLOR, POC: YELLOW
COMMENT: NORMAL
CREAT SERPL-MCNC: 0.7 MG/DL (ref 0.5–0.9)
EOSINOPHIL # BLD: 0.1 K/UL (ref 0–0.4)
EOSINOPHILS RELATIVE PERCENT: 1 % (ref 1–4)
ERYTHROCYTE [DISTWIDTH] IN BLOOD BY AUTOMATED COUNT: 14.6 % (ref 12.5–15.4)
GFR SERPL CREATININE-BSD FRML MDRD: >60 ML/MIN/1.73M2
GLUCOSE SERPL-MCNC: 117 MG/DL (ref 70–99)
GLUCOSE UR STRIP-MCNC: NEGATIVE MG/DL
GLUCOSE URINE, POC: NORMAL
HCT VFR BLD AUTO: 38.7 % (ref 36–46)
HGB BLD-MCNC: 12.7 G/DL (ref 12–16)
HGB UR QL STRIP.AUTO: NEGATIVE
KETONES UR STRIP-MCNC: NEGATIVE MG/DL
KETONES, POC: NORMAL
LEUKOCYTE EST, POC: NORMAL
LEUKOCYTE ESTERASE UR QL STRIP: NEGATIVE
LYMPHOCYTES NFR BLD: 1.9 K/UL (ref 1–4.8)
LYMPHOCYTES RELATIVE PERCENT: 18 % (ref 24–44)
MAGNESIUM SERPL-MCNC: 1.8 MG/DL (ref 1.6–2.6)
MCH RBC QN AUTO: 28.1 PG (ref 26–34)
MCHC RBC AUTO-ENTMCNC: 32.9 G/DL (ref 31–37)
MCV RBC AUTO: 85.4 FL (ref 80–100)
MONOCYTES NFR BLD: 0.8 K/UL (ref 0.1–1.2)
MONOCYTES NFR BLD: 8 % (ref 2–11)
NEUTROPHILS NFR BLD: 72 % (ref 36–66)
NEUTS SEG NFR BLD: 7.7 K/UL (ref 1.8–7.7)
NITRITE UR QL STRIP: NEGATIVE
NITRITE, POC: NORMAL
PH UR STRIP: 6 [PH] (ref 5–8)
PH, POC: 6
PLATELET # BLD AUTO: 261 K/UL (ref 140–450)
PMV BLD AUTO: 7.3 FL (ref 6–12)
POTASSIUM SERPL-SCNC: 3.3 MMOL/L (ref 3.7–5.3)
PROT SERPL-MCNC: 6.7 G/DL (ref 6.4–8.3)
PROT UR STRIP-MCNC: NEGATIVE MG/DL
PROTEIN, POC: NORMAL
RBC # BLD AUTO: 4.53 M/UL (ref 4–5.2)
SODIUM SERPL-SCNC: 139 MMOL/L (ref 135–144)
SP GR UR STRIP: 1.01 (ref 1–1.03)
SPECIFIC GRAVITY, POC: 1.01
UROBILINOGEN UR STRIP-ACNC: NORMAL EU/DL (ref 0–1)
UROBILINOGEN, POC: 0.2
WBC OTHER # BLD: 10.5 K/UL (ref 3.5–11)

## 2023-08-18 PROCEDURE — 81003 URINALYSIS AUTO W/O SCOPE: CPT

## 2023-08-18 PROCEDURE — 1123F ACP DISCUSS/DSCN MKR DOCD: CPT | Performed by: FAMILY MEDICINE

## 2023-08-18 PROCEDURE — 99284 EMERGENCY DEPT VISIT MOD MDM: CPT

## 2023-08-18 PROCEDURE — 99214 OFFICE O/P EST MOD 30 MIN: CPT | Performed by: FAMILY MEDICINE

## 2023-08-18 PROCEDURE — 81003 URINALYSIS AUTO W/O SCOPE: CPT | Performed by: FAMILY MEDICINE

## 2023-08-18 PROCEDURE — 3074F SYST BP LT 130 MM HG: CPT | Performed by: FAMILY MEDICINE

## 2023-08-18 PROCEDURE — 3078F DIAST BP <80 MM HG: CPT | Performed by: FAMILY MEDICINE

## 2023-08-18 PROCEDURE — 74176 CT ABD & PELVIS W/O CONTRAST: CPT

## 2023-08-18 PROCEDURE — 2580000003 HC RX 258: Performed by: PHYSICIAN ASSISTANT

## 2023-08-18 PROCEDURE — 85025 COMPLETE CBC W/AUTO DIFF WBC: CPT

## 2023-08-18 PROCEDURE — 80053 COMPREHEN METABOLIC PANEL: CPT

## 2023-08-18 PROCEDURE — 6370000000 HC RX 637 (ALT 250 FOR IP): Performed by: PHYSICIAN ASSISTANT

## 2023-08-18 PROCEDURE — 83735 ASSAY OF MAGNESIUM: CPT

## 2023-08-18 PROCEDURE — 36415 COLL VENOUS BLD VENIPUNCTURE: CPT

## 2023-08-18 RX ORDER — ONDANSETRON 2 MG/ML
4 INJECTION INTRAMUSCULAR; INTRAVENOUS ONCE
Status: DISCONTINUED | OUTPATIENT
Start: 2023-08-18 | End: 2023-08-18 | Stop reason: HOSPADM

## 2023-08-18 RX ORDER — SULFAMETHOXAZOLE AND TRIMETHOPRIM 800; 160 MG/1; MG/1
1 TABLET ORAL ONCE
Status: COMPLETED | OUTPATIENT
Start: 2023-08-18 | End: 2023-08-18

## 2023-08-18 RX ORDER — SULFAMETHOXAZOLE AND TRIMETHOPRIM 800; 160 MG/1; MG/1
1 TABLET ORAL 2 TIMES DAILY
Qty: 19 TABLET | Refills: 0 | Status: SHIPPED
Start: 2023-08-18 | End: 2023-08-25 | Stop reason: SINTOL

## 2023-08-18 RX ORDER — METRONIDAZOLE 500 MG/1
500 TABLET ORAL 3 TIMES DAILY
Qty: 29 TABLET | Refills: 0 | Status: SHIPPED | OUTPATIENT
Start: 2023-08-18 | End: 2023-08-28

## 2023-08-18 RX ORDER — ONDANSETRON 4 MG/1
4 TABLET, FILM COATED ORAL EVERY 8 HOURS PRN
Qty: 20 TABLET | Refills: 0 | Status: SHIPPED | OUTPATIENT
Start: 2023-08-18

## 2023-08-18 RX ORDER — 0.9 % SODIUM CHLORIDE 0.9 %
500 INTRAVENOUS SOLUTION INTRAVENOUS ONCE
Status: COMPLETED | OUTPATIENT
Start: 2023-08-18 | End: 2023-08-18

## 2023-08-18 RX ORDER — METRONIDAZOLE 500 MG/1
500 TABLET ORAL ONCE
Status: COMPLETED | OUTPATIENT
Start: 2023-08-18 | End: 2023-08-18

## 2023-08-18 RX ADMIN — SULFAMETHOXAZOLE AND TRIMETHOPRIM 1 TABLET: 800; 160 TABLET ORAL at 20:18

## 2023-08-18 RX ADMIN — METRONIDAZOLE 500 MG: 500 TABLET ORAL at 20:18

## 2023-08-18 RX ADMIN — SODIUM CHLORIDE 500 ML: 9 INJECTION, SOLUTION INTRAVENOUS at 17:47

## 2023-08-18 ASSESSMENT — ENCOUNTER SYMPTOMS
SORE THROAT: 0
EYE REDNESS: 0
DIARRHEA: 1
VOMITING: 0
NAUSEA: 1
COUGH: 0
EYE DISCHARGE: 0
ABDOMINAL PAIN: 1
SHORTNESS OF BREATH: 0
BACK PAIN: 0

## 2023-08-18 ASSESSMENT — PAIN SCALES - GENERAL: PAINLEVEL_OUTOF10: 4

## 2023-08-18 ASSESSMENT — PAIN - FUNCTIONAL ASSESSMENT: PAIN_FUNCTIONAL_ASSESSMENT: 0-10

## 2023-08-18 ASSESSMENT — PAIN DESCRIPTION - LOCATION: LOCATION: ABDOMEN

## 2023-08-18 NOTE — PROGRESS NOTES
female with Hx of  has a past medical history of Anxiety, Back pain, Chronic back pain, Chronic maxillary sinusitis, Depression, Generalized anxiety disorder, GERD (gastroesophageal reflux disease), Hyperlipidemia, Hyperlipidemia LDL goal < 100, Hypertension, Hypertension, benign, Lumbago with sciatica, right side, Major depression, chronic, Osteoporosis, Peripheral polyneuropathy, Pulmonary emphysema ([address]), Tobacco use disorder, continuous, and Vitamin D deficiency. who presents to clinic with due to   Chief Complaint   Patient presents with    Abdominal Pain     Abd pain/low back pain for 2 days 9/10 pt states it feels like her insides are falling out when she urinates. Denies blood in stool or urine. HPI   Patient of Dr. Melanie Rivas.  -Patient here for acute visit for extreme abdominal pain. Patient had intense abdominal pain that started 2 days ago, with aminal pain and pain with urination. Yesterday patient started to have fevers and temperatures. Temperature 101.2 today she took Tylenol at 2:50 PM and is now 99.1 office. Patient states it hurts really bad to urinate and feels like her insides are coming out when urinating. She has pain with leaning forward her abdominal pain goes from front to the back bending forward. She able to describe the pain just says it is a lot of pain 8-9 out of 10. She has never had this type of pain before. She has no flank pain  Patient has history of delivering 1 child vaginally, her bowel movements are normal, has had no vomiting, she is not eating today due to her pain. She normally has no problems with urinating. Urinalysis today was negative for blood, protein, LE or nitrates.    Denies shortness of breath, chest pain, diarrhea, blood in stool, blood in urine, no weakness, positive for severe abdominal pain, fever yesterday and today, nauseated, pain with urination, positive for low back and pelvic pain, no visual changes    Review Of Systems  Review of

## 2023-08-18 NOTE — PATIENT INSTRUCTIONS
Thank you for coming today. It was nice to meet you  -sorry you are not feeling well.  -As discussed please go to the emergency department if your lower abdominal pain evaluated, please do imaging, labs and you may be admitted for treatment/observation.  -Follow-up with our office after your hospital visit  -If you have questions or concerns please call the office.

## 2023-08-21 PROBLEM — E87.6 HYPOKALEMIA: Status: ACTIVE | Noted: 2023-08-18

## 2023-08-25 ENCOUNTER — HOSPITAL ENCOUNTER (EMERGENCY)
Age: 68
Discharge: HOME | End: 2023-08-26
Attending: EMERGENCY MEDICINE
Payer: MEDICARE

## 2023-08-25 VITALS
WEIGHT: 180 LBS | DIASTOLIC BLOOD PRESSURE: 78 MMHG | RESPIRATION RATE: 16 BRPM | OXYGEN SATURATION: 97 % | SYSTOLIC BLOOD PRESSURE: 168 MMHG | TEMPERATURE: 98.2 F | HEIGHT: 66 IN | BODY MASS INDEX: 28.93 KG/M2

## 2023-08-25 DIAGNOSIS — L50.9 URTICARIA: Primary | ICD-10-CM

## 2023-08-25 PROCEDURE — 99283 EMERGENCY DEPT VISIT LOW MDM: CPT | Performed by: EMERGENCY MEDICINE

## 2023-08-25 RX ORDER — ESOMEPRAZOLE MAGNESIUM 20 MG/1
20 FOR SUSPENSION ORAL DAILY
COMMUNITY

## 2023-08-25 RX ORDER — TRIAMCINOLONE ACETONIDE 1 MG/G
CREAM TOPICAL
Qty: 30 G | Refills: 0 | Status: SHIPPED | OUTPATIENT
Start: 2023-08-25

## 2023-08-25 RX ORDER — DIPHENHYDRAMINE HCL 25 MG
25 TABLET ORAL ONCE
Status: COMPLETED | OUTPATIENT
Start: 2023-08-26 | End: 2023-08-26

## 2023-08-25 ASSESSMENT — ENCOUNTER SYMPTOMS
COUGH: 0
SHORTNESS OF BREATH: 0
DIARRHEA: 0
VOMITING: 0
SINUS PAIN: 0
SORE THROAT: 0
NAUSEA: 0
ABDOMINAL PAIN: 0

## 2023-08-25 ASSESSMENT — PAIN SCALES - GENERAL: PAINLEVEL_OUTOF10: 0

## 2023-08-25 ASSESSMENT — PAIN - FUNCTIONAL ASSESSMENT: PAIN_FUNCTIONAL_ASSESSMENT: 0-10

## 2023-08-26 PROCEDURE — 6370000000 HC RX 637 (ALT 250 FOR IP): Performed by: NURSE PRACTITIONER

## 2023-08-26 RX ADMIN — DIPHENHYDRAMINE HYDROCHLORIDE 25 MG: 25 TABLET ORAL at 00:04

## 2023-08-26 NOTE — ED PROVIDER NOTES
[facility] Emergency Department  [address] [address] [facility] [address]  Phone: [phone]  Fax: [phone]        Pt Name: Elizabet Wilson  MRN: [id number]  [address] 1955  Date of evaluation: 8/25/23    [address]       Chief Complaint   Patient presents with    Rash     Pt reports she is on flagyl and bactrim for diverticulitis. Started noticing rash on wrist and hands yesterday, but now spreading under arm and upper legs. Rash is itchy. Denies diff breathing, or swallowing. HISTORY OF PRESENT ILLNESS (Location/Symptom, Timing/Onset, Context/Setting, Quality, Duration, Modifying Factors, Severity)      Elizabet Wilson is a 79 y.o. female with no pertinent PMH who presents to the ED via private auto with concern for allergic reaction. Patient reportedly had onset of symptoms beginning yesterday. Patient has a recent cellulitis. Patient was treated with antibiotics Bactrim, Flagyl. Patient had a suspected allergy to sulfas however due to her additional allergies medication was given. Patient took this for approximate 7 days before symptoms began yesterday. Patient has a rash that is clearly raised, urticarial and itching to the bilateral wrists, eczema and the thighs. There is no difficulty swallowing or breathing. No tongue or lip swelling. Patient reports that her diverticular symptoms have nearly resolved.     PAST MEDICAL / SURGICAL / SOCIAL / FAMILY HISTORY     PMH:  has a past medical history of Anxiety, Back pain, Chronic back pain, Chronic maxillary sinusitis, Depression, Diverticulitis of sigmoid colon, Generalized anxiety disorder, GERD (gastroesophageal reflux disease), Hyperlipidemia, Hyperlipidemia LDL goal < 100, Hypertension, Hypertension, benign, Hypokalemia, Lumbago with sciatica, right side, Major depression, chronic, Osteoporosis, Peripheral polyneuropathy, Pulmonary emphysema ([address]), Tobacco use disorder, continuous, and Vitamin D this note were completed with a voice recognition program.  Efforts were made to edit the dictations but occasionally words aremis-transcribed.)       LILI Gutierrez - CNP  08/25/23 LILI Maravilla CNP  08/26/23 0021

## 2023-08-26 NOTE — ED PROVIDER NOTES
Pt Name: Geovanna Blum  MRN: [id number]  [address] 1955  Date of evaluation: 8/25/23       Geovanna Blum is a 79 y.o. female who presents with Rash (Pt reports she is on flagyl and bactrim for diverticulitis. Started noticing rash on wrist and hands yesterday, but now spreading under arm and upper legs. Rash is itchy. Denies diff breathing, or swallowing. )        Based on the medical record, the care appears appropriate. I was personally available for consultation in the Emergency Department.     Mary Dotson MD  Attending Emergency  Physician        Mary Dotson MD  08/25/23 [id number]

## 2023-08-26 NOTE — DISCHARGE INSTRUCTIONS
Stop Bactrim, continue Flagyl for full duration, take Benadryl, Pepcid as needed for allergic symptoms, use topical steroid cream as prescribed/. Monitor for any worsening signs infection such as tongue swelling, difficult swallowing or breathing. Return to ER with any new or worsened symptoms, follow-up with primary physician.

## 2023-08-28 ENCOUNTER — TELEPHONE (OUTPATIENT)
Age: 68
End: 2023-08-28

## 2023-08-28 PROBLEM — D32.0 MENINGIOMA, CEREBRAL (HCC): Status: ACTIVE | Noted: 2023-08-28

## 2023-08-28 NOTE — ASSESSMENT & PLAN NOTE
Evaluated on 6/8/2017. PFT showed no improvement with bronchodilators. -patient has had only one exacerbation. symbacort and spiriva caused ulcers.   - ordered duoneb w/ tubing

## 2023-08-28 NOTE — ASSESSMENT & PLAN NOTE
recent ER visit, had multiple systolic blood presssure readings in 160s.     highest at home is 130s/70s    - track daily

## 2023-08-28 NOTE — ASSESSMENT & PLAN NOTE
1.5 packs a day for 45 years = 67.5 pack years. - counciled smoking cesation. can worsen anxiety. increased mortality for CAD, CVD, cancer.     - patient was not ready to quit at last office visit.

## 2023-08-28 NOTE — TELEPHONE ENCOUNTER
[address] [address]     Date of Visit:  2023  Patient Name: Isidro Paulson   Patient :  1955     Reason for call:   Diverticulitis follow up      ASSESSMENT/PLAN     Patient noted that she was out of flagyl and still feeling some mild twinges on occasion. She has progressed from a full liquid diet to eating tuna, and white bread, but has avoided eating red meat. She denied having had nausea since stopping the bactrim. Recommended continuing with her limited diet until two days after symptoms resolve. Patient advised to call office if symptoms worsen. If symptoms become more severe, please call us back at [phone]; or if you feel like you are having a life threatening emergency, please call 911 or go to the local ER. Please call us if you have any questions or concerns. Thank you for letting us help you on your health journey today!      Next appointment: 2023        COMMUNICATION:       Electronically signed by Nik Mattson DO on 2023 at 4:32 PM

## 2023-08-28 NOTE — TELEPHONE ENCOUNTER
Had to stop taking the Bactrim due to a reaction. Was in ED this weekend. Asking if she had enough of the bactrim to take care of the diverticulitis. Please advise.

## 2023-09-01 DIAGNOSIS — I10 HYPERTENSION, BENIGN: Chronic | ICD-10-CM

## 2023-09-01 DIAGNOSIS — E78.5 HYPERLIPIDEMIA WITH TARGET LDL LESS THAN 100: Chronic | ICD-10-CM

## 2023-09-04 RX ORDER — ROSUVASTATIN CALCIUM 20 MG/1
20 TABLET, COATED ORAL DAILY
Qty: 90 TABLET | Refills: 1 | Status: SHIPPED | OUTPATIENT
Start: 2023-09-04

## 2023-09-04 RX ORDER — LISINOPRIL 20 MG/1
20 TABLET ORAL DAILY
Qty: 90 TABLET | Refills: 1 | Status: SHIPPED | OUTPATIENT
Start: 2023-09-04

## 2023-10-10 ENCOUNTER — OFFICE VISIT (OUTPATIENT)
Age: 68
End: 2023-10-10
Payer: MEDICARE

## 2023-10-10 VITALS
HEIGHT: 66 IN | SYSTOLIC BLOOD PRESSURE: 114 MMHG | HEART RATE: 77 BPM | DIASTOLIC BLOOD PRESSURE: 72 MMHG | WEIGHT: 173.7 LBS | BODY MASS INDEX: 27.92 KG/M2

## 2023-10-10 DIAGNOSIS — D32.0 MENINGIOMA, CEREBRAL (HCC): Primary | ICD-10-CM

## 2023-10-10 PROCEDURE — 99203 OFFICE O/P NEW LOW 30 MIN: CPT | Performed by: NEUROLOGICAL SURGERY

## 2023-10-10 PROCEDURE — 3074F SYST BP LT 130 MM HG: CPT | Performed by: NEUROLOGICAL SURGERY

## 2023-10-10 PROCEDURE — 3078F DIAST BP <80 MM HG: CPT | Performed by: NEUROLOGICAL SURGERY

## 2023-10-10 PROCEDURE — 1123F ACP DISCUSS/DSCN MKR DOCD: CPT | Performed by: NEUROLOGICAL SURGERY

## 2023-10-10 ASSESSMENT — ENCOUNTER SYMPTOMS
COUGH: 0
WHEEZING: 0
ABDOMINAL PAIN: 0
VOMITING: 0
SHORTNESS OF BREATH: 0
NAUSEA: 0
CONSTIPATION: 0
BACK PAIN: 0

## 2023-10-10 NOTE — PROGRESS NOTES
[facility] NEUROSURGERY  [address] [place] [address] [address] [place], [address] [facility] [address]  Dept: [phone]  Dept Fax: [phone]     Patient:  Edmund Lopez  YOB: 1955  Date: 10/10/23      Chief Complaint   Patient presents with    menigioma           HPI:     Ms. Janette Rascon presents to the office today as a new patient for the evaluation of an incidentally discovered meningioma. A head CT was done to evaluate an episode of sort of generalized malaise with elevated blood pressure. This demonstrated a calcified lesion overlying the left cerebral convexity. She denies any difficulty with headaches. She has not had any altered sensation in the extremities. She has not had any weakness. This seems to be a truly incidental finding. History:     Past Medical History:   Diagnosis Date    Back pain     Chronic back pain     Chronic maxillary sinusitis     Diverticulitis of sigmoid colon     Generalized anxiety disorder 07/23/2015    GERD (gastroesophageal reflux disease)     Hyperlipidemia     Hypertension, benign 07/23/2015    Hypokalemia 08/18/2023    seen on CMP from encounter on8/18/2023    Lumbago with sciatica, right side     Major depression, chronic 08/27/2015    Osteoporosis     Peripheral polyneuropathy     Pulmonary emphysema ([address])     Tobacco use disorder, continuous 07/23/2015    Vitamin D deficiency      Past Surgical History:   Procedure Laterality Date    CHOLECYSTECTOMY  10/2006    COLONOSCOPY  02/04/2011    small hemorrhoids, no lesions seen    COLONOSCOPY N/A 12/29/2017    COLONOSCOPY WITH BIOPSY performed by Zahira Antonio MD at [address]  12/29/2017    Few small diverticuli. No ileitis.   No signs of colitis    SKIN BIOPSY Left 10/27/2020    lower leg  [facility]     Family History   Problem Relation Age of Onset    High Blood Pressure Mother     Heart Disease Mother     Alzheimer's

## 2023-10-10 NOTE — PROGRESS NOTES
Review of Systems   Constitutional:  Negative for chills, fatigue, fever and unexpected weight change. Respiratory:  Negative for cough, shortness of breath and wheezing. Cardiovascular:  Negative for chest pain and palpitations. Gastrointestinal:  Negative for abdominal pain (noted), constipation, nausea and vomiting. Musculoskeletal:  Negative for back pain, joint swelling, neck pain and neck stiffness. Neurological:  Negative for dizziness, numbness and headaches. + abdominal pain. + dizzy spells.  + heartburn

## 2023-12-01 DIAGNOSIS — F17.200 CURRENT SMOKER: ICD-10-CM

## 2023-12-01 DIAGNOSIS — I10 HYPERTENSION, BENIGN: Chronic | ICD-10-CM

## 2023-12-01 DIAGNOSIS — E78.5 HYPERLIPIDEMIA WITH TARGET LDL LESS THAN 100: Primary | Chronic | ICD-10-CM

## 2023-12-07 RX ORDER — LISINOPRIL 20 MG/1
20 TABLET ORAL DAILY
Qty: 100 TABLET | Refills: 2 | Status: SHIPPED | OUTPATIENT
Start: 2023-12-07

## 2023-12-07 RX ORDER — ROSUVASTATIN CALCIUM 20 MG/1
20 TABLET, COATED ORAL DAILY
Qty: 100 TABLET | Refills: 2 | Status: SHIPPED | OUTPATIENT
Start: 2023-12-07

## 2023-12-07 NOTE — TELEPHONE ENCOUNTER
Left patient a message to call the office back, if patient returns our phone call please let her know info above per Dr. Kenneth Handley. Thanks.

## 2023-12-20 ENCOUNTER — OFFICE VISIT (OUTPATIENT)
Age: 68
End: 2023-12-20
Payer: MEDICARE

## 2023-12-20 VITALS
WEIGHT: 176.4 LBS | BODY MASS INDEX: 28.47 KG/M2 | DIASTOLIC BLOOD PRESSURE: 60 MMHG | TEMPERATURE: 70 F | RESPIRATION RATE: 12 BRPM | SYSTOLIC BLOOD PRESSURE: 128 MMHG

## 2023-12-20 DIAGNOSIS — J43.9 PULMONARY EMPHYSEMA, UNSPECIFIED EMPHYSEMA TYPE (HCC): ICD-10-CM

## 2023-12-20 DIAGNOSIS — J44.9 CHRONIC OBSTRUCTIVE PULMONARY DISEASE, UNSPECIFIED COPD TYPE (HCC): ICD-10-CM

## 2023-12-20 DIAGNOSIS — I10 PRIMARY HYPERTENSION: Primary | ICD-10-CM

## 2023-12-20 DIAGNOSIS — E87.6 HYPOKALEMIA: ICD-10-CM

## 2023-12-20 DIAGNOSIS — F17.209 TOBACCO USE DISORDER, CONTINUOUS: Chronic | ICD-10-CM

## 2023-12-20 DIAGNOSIS — E78.2 MIXED HYPERLIPIDEMIA: ICD-10-CM

## 2023-12-20 DIAGNOSIS — R01.1 SYSTOLIC MURMUR: ICD-10-CM

## 2023-12-20 PROCEDURE — 93005 ELECTROCARDIOGRAM TRACING: CPT | Performed by: STUDENT IN AN ORGANIZED HEALTH CARE EDUCATION/TRAINING PROGRAM

## 2023-12-20 PROCEDURE — 99213 OFFICE O/P EST LOW 20 MIN: CPT | Performed by: STUDENT IN AN ORGANIZED HEALTH CARE EDUCATION/TRAINING PROGRAM

## 2023-12-20 RX ORDER — ADHESIVE BANDAGE 3/4"
1 BANDAGE TOPICAL DAILY
Qty: 1 EACH | Refills: 0 | Status: SHIPPED | OUTPATIENT
Start: 2023-12-20

## 2023-12-20 RX ORDER — IPRATROPIUM BROMIDE AND ALBUTEROL SULFATE 2.5; .5 MG/3ML; MG/3ML
1 SOLUTION RESPIRATORY (INHALATION) EVERY 4 HOURS PRN
COMMUNITY
End: 2023-12-20 | Stop reason: SDUPTHER

## 2023-12-20 RX ORDER — IPRATROPIUM BROMIDE AND ALBUTEROL SULFATE 2.5; .5 MG/3ML; MG/3ML
1 SOLUTION RESPIRATORY (INHALATION) EVERY 4 HOURS PRN
Qty: 360 ML | Refills: 1 | Status: SHIPPED | OUTPATIENT
Start: 2023-12-20 | End: 2024-06-17

## 2023-12-20 RX ORDER — NEBULIZER ACCESSORIES
1 KIT MISCELLANEOUS DAILY
Qty: 1 KIT | Refills: 0 | Status: SHIPPED | OUTPATIENT
Start: 2023-12-20 | End: 2023-12-21

## 2023-12-20 NOTE — PROGRESS NOTES
Lymphocytes % 12/19/2023 31  24 - 43 % Final    Monocytes % 12/19/2023 6  3 - 12 % Final    Eosinophils % 12/19/2023 2  1 - 4 % Final    Basophils % 12/19/2023 1  0 - 2 % Final    Immature Granulocytes 12/19/2023 0  0 % Final    Neutrophils Absolute 12/19/2023 4.43  1.50 - 8.10 k/uL Final    Lymphocytes Absolute 12/19/2023 2.30  1.10 - 3.70 k/uL Final    Monocytes Absolute 12/19/2023 0.40  0.10 - 1.20 k/uL Final    Eosinophils Absolute 12/19/2023 0.12  0.00 - 0.44 k/uL Final    Basophils Absolute 12/19/2023 0.05  0.00 - 0.20 k/uL Final    Absolute Immature Granulocyte 12/19/2023 <0.03  0.00 - 0.30 k/uL Final    Sodium 12/19/2023 140  135 - 144 mmol/L Final    Potassium 12/19/2023 4.6  3.7 - 5.3 mmol/L Final    Chloride 12/19/2023 104  98 - 107 mmol/L Final    CO2 12/19/2023 27  20 - 31 mmol/L Final    Anion Gap 12/19/2023 9  9 - 17 mmol/L Final    Glucose 12/19/2023 91  70 - 99 mg/dL Final    BUN 12/19/2023 8  8 - 23 mg/dL Final    Creatinine 12/19/2023 0.7  0.5 - 0.9 mg/dL Final    Est, Glom Filt Rate 12/19/2023 >60  >60 mL/min/1.73m2 Final    Comment:       These results are not intended for use in patients <25years of age. eGFR results are calculated without a race factor using the 2021 CKD-EPI equation. Careful clinical correlation is recommended, particularly when comparing to results   calculated using previous equations. The CKD-EPI equation is less accurate in patients with extremes of muscle mass, extra-renal   metabolism of creatine, excessive creatine ingestion, or following therapy that affects   renal tubular secretion.       Calcium 12/19/2023 9.4  8.6 - 10.4 mg/dL Final    Total Protein 12/19/2023 6.6  6.4 - 8.3 g/dL Final    Albumin 12/19/2023 3.9  3.5 - 5.2 g/dL Final    Albumin/Globulin Ratio 12/19/2023 1.4  1.0 - 2.5 Final    Total Bilirubin 12/19/2023 0.3  0.3 - 1.2 mg/dL Final    Alkaline Phosphatase 12/19/2023 105 (H)  35 - 104 U/L Final    ALT 12/19/2023 6  5 - 33 U/L Final    AST

## 2023-12-20 NOTE — PATIENT INSTRUCTIONS
Thank you for following up with us at [address] [facility]. office! It was a pleasure to meet you today! Our plan is the following:  - I have generated an order for you to get a new blood pressure cuff. Once you get the cuff, I like for you to measure your blood pressure every day at the same time of day. - I like for you to be seen again in the office in 1 month. Bring your blood pressure log with you, or take a photo in case you forget to bring your log with you so that your home BP readings can be discussed at the follow-up appointment. - Because of the new heart murmur, I have ordered an EKG as well as an echo (heart ultrasound). If you can get the echo done before your next appointment, great. If not, you will receive a phone call with the results of the testing once it comes back. - We also discussed your sinus congestion and frequent headaches. Since you feel better when you wake up and you do not usually have a headache, I think these headaches are likely due to sinus congestion. Sleeping with a humidifier in your room is obviously helping her symptoms, so you may consider putting a humidifier in another room to spend a lot of time in. You can also use the nasal mist to help increase the humidity in your nostrils. I know using the Net but using it regularly can help flush outi pot can be uncomfortable, but using it regularly can help flush out the thick mucus and help with some of that discomfort. Your medication list is included in the after visit summary. If you find any differences when compared to your medications at home, please contact the office ([phone]) to let us know. You can also call the office if you have any additional questions or concerns and speak to one of the staff. They will triage and forward the message to the provider. Have a great rest of your day!

## 2023-12-20 NOTE — ASSESSMENT & PLAN NOTE
-  unclear control; home BP monitor reading hypertensive to SBP 160s, but machine reading compared to in office reading is approx. [id number] on systolic reading  - home BP cuff order generated and printed.  Pt will take daily BP readings; follow-up in 1 month with BP log to determine if pt still has adequate BP control

## 2023-12-22 PROBLEM — R01.1 SYSTOLIC MURMUR: Status: ACTIVE | Noted: 2023-12-22

## 2023-12-22 NOTE — ASSESSMENT & PLAN NOTE
- Potassium WNL.   Advised patient hypokalemia diagnosis will likely stay on problem list until several labs have redemonstrated normal potassium

## 2023-12-22 NOTE — ASSESSMENT & PLAN NOTE
- needs nebulizer and tubing order printed. - Also needs DuoNeb nebulizer treatments sent to pharmacy.

## 2023-12-22 NOTE — ASSESSMENT & PLAN NOTE
- EKG done in office, was WNL  - Pt had Echo done in 2017; will order a new Echo for newly diagnosed systolic murmur

## 2023-12-27 ENCOUNTER — TELEPHONE (OUTPATIENT)
Age: 68
End: 2023-12-27

## 2023-12-27 NOTE — TELEPHONE ENCOUNTER
Patient called and stated that her pharmacy is having a hard time trying to find the ipratropium from any pharmacy, so she wanted to know if she can just get a script for the Albuterol solution for her nebulizer.  She would like a 90 day supply sent to [facility]

## 2023-12-29 RX ORDER — ALBUTEROL SULFATE 2.5 MG/3ML
2.5 SOLUTION RESPIRATORY (INHALATION) 4 TIMES DAILY PRN
Qty: 120 EACH | Refills: 0 | Status: SHIPPED | OUTPATIENT
Start: 2023-12-29 | End: 2024-03-28

## 2023-12-31 ENCOUNTER — TELEPHONE (OUTPATIENT)
Age: 68
End: 2023-12-31

## 2023-12-31 DIAGNOSIS — J01.90 ACUTE BACTERIAL RHINOSINUSITIS: Primary | ICD-10-CM

## 2023-12-31 DIAGNOSIS — B96.89 ACUTE BACTERIAL RHINOSINUSITIS: Primary | ICD-10-CM

## 2023-12-31 RX ORDER — DOXYCYCLINE HYCLATE 100 MG
100 TABLET ORAL 2 TIMES DAILY
Qty: 14 TABLET | Refills: 0 | Status: SHIPPED | OUTPATIENT
Start: 2023-12-31 | End: 2024-01-07

## 2024-01-04 ENCOUNTER — HOSPITAL ENCOUNTER (OUTPATIENT)
Age: 69
Discharge: HOME | End: 2024-01-06
Payer: MEDICARE

## 2024-01-04 VITALS — HEIGHT: 66 IN | WEIGHT: 176 LBS | BODY MASS INDEX: 28.28 KG/M2

## 2024-01-04 DIAGNOSIS — I10 PRIMARY HYPERTENSION: ICD-10-CM

## 2024-01-04 DIAGNOSIS — R01.1 SYSTOLIC MURMUR: ICD-10-CM

## 2024-01-04 LAB
ECHO AO ROOT DIAM: 3.2 CM
ECHO AO ROOT INDEX: 1.69 CM/M2
ECHO AV AREA PEAK VELOCITY: 3.1 CM2
ECHO AV AREA VTI: 3.4 CM2
ECHO AV AREA/BSA PEAK VELOCITY: 1.6 CM2/M2
ECHO AV AREA/BSA VTI: 1.8 CM2/M2
ECHO AV MEAN GRADIENT: 4 MMHG
ECHO AV MEAN VELOCITY: 0.9 M/S
ECHO AV PEAK GRADIENT: 6 MMHG
ECHO AV PEAK VELOCITY: 1.3 M/S
ECHO AV VELOCITY RATIO: 1
ECHO AV VTI: 22.4 CM
ECHO BSA: 1.93 M2
ECHO EST RA PRESSURE: 3 MMHG
ECHO IVC EXP: 1.4 CM
ECHO IVC INSP: 0.6 CM
ECHO LA AREA 2C: 13.9 CM2
ECHO LA AREA 4C: 10.9 CM2
ECHO LA DIAMETER INDEX: 1.96 CM/M2
ECHO LA DIAMETER: 3.7 CM
ECHO LA MAJOR AXIS: 4.3 CM
ECHO LA MINOR AXIS: 4.6 CM
ECHO LA TO AORTIC ROOT RATIO: 1.16
ECHO LA VOL BP: 28 ML (ref 22–52)
ECHO LA VOL MOD A2C: 34 ML (ref 22–52)
ECHO LA VOL MOD A4C: 21 ML (ref 22–52)
ECHO LA VOL/BSA BIPLANE: 15 ML/M2 (ref 16–34)
ECHO LA VOLUME INDEX MOD A2C: 18 ML/M2 (ref 16–34)
ECHO LA VOLUME INDEX MOD A4C: 11 ML/M2 (ref 16–34)
ECHO LV E' LATERAL VELOCITY: 12 CM/S
ECHO LV E' SEPTAL VELOCITY: 10 CM/S
ECHO LV FRACTIONAL SHORTENING: 32 % (ref 28–44)
ECHO LV INTERNAL DIMENSION DIASTOLE INDEX: 2.33 CM/M2
ECHO LV INTERNAL DIMENSION DIASTOLIC: 4.4 CM (ref 3.9–5.3)
ECHO LV INTERNAL DIMENSION SYSTOLIC INDEX: 1.59 CM/M2
ECHO LV INTERNAL DIMENSION SYSTOLIC: 3 CM
ECHO LV IVSD: 0.9 CM (ref 0.6–0.9)
ECHO LV MASS 2D: 128 G (ref 67–162)
ECHO LV MASS INDEX 2D: 67.7 G/M2 (ref 43–95)
ECHO LV POSTERIOR WALL DIASTOLIC: 0.9 CM (ref 0.6–0.9)
ECHO LV RELATIVE WALL THICKNESS RATIO: 0.41
ECHO LVOT AREA: 3.1 CM2
ECHO LVOT AV VTI INDEX: 1.09
ECHO LVOT DIAM: 2 CM
ECHO LVOT MEAN GRADIENT: 4 MMHG
ECHO LVOT PEAK GRADIENT: 6 MMHG
ECHO LVOT PEAK VELOCITY: 1.3 M/S
ECHO LVOT STROKE VOLUME INDEX: 40.5 ML/M2
ECHO LVOT SV: 76.6 ML
ECHO LVOT VTI: 24.4 CM
ECHO MV A VELOCITY: 0.83 M/S
ECHO MV E DECELERATION TIME (DT): 156 MS
ECHO MV E VELOCITY: 0.62 M/S
ECHO MV E/A RATIO: 0.75
ECHO MV E/E' LATERAL: 5.17
ECHO MV E/E' RATIO (AVERAGED): 5.68
ECHO RIGHT VENTRICULAR SYSTOLIC PRESSURE (RVSP): 25 MMHG
ECHO RV BASAL DIMENSION: 2.4 CM
ECHO RV FREE WALL PEAK S': 11 CM/S
ECHO TV REGURGITANT MAX VELOCITY: 2.36 M/S
ECHO TV REGURGITANT PEAK GRADIENT: 22 MMHG

## 2024-01-04 PROCEDURE — 93306 TTE W/DOPPLER COMPLETE: CPT

## 2024-01-16 DIAGNOSIS — F40.240 CLAUSTROPHOBIA: Primary | ICD-10-CM

## 2024-01-16 NOTE — TELEPHONE ENCOUNTER
1/16/24 Pt called today - she is getting ready to schedule her brain MRI. Unable to get through MRI without xanax - claustrophobia. She states \"I cannot take valium or that other one (ativan) as I get too loopy and don't tolerate them. I ALWAYS have xanax and it works for me.\"  [facility] [address] in [place].  [place] shows last time she filled any controlled substance was Xanax 0.25 mg #15 was 7/1/2020 at [facility] on [address].  Dr Block please advise on rx.  CARLOS Samuels RN

## 2024-01-18 NOTE — TELEPHONE ENCOUNTER
1/18/24 Discussed w Dr Block - he ordered 1 xanax 0.25 mg to take prior to MRI as needed for claustrophobia.  1:04 pm Called in RX and LM for [facility] on [address]. Then LMOM for pt w this information.  Will send rx back to Dr Block to sign off as this was a phoned in vaibhav Samuels RN

## 2024-01-19 ENCOUNTER — OFFICE VISIT (OUTPATIENT)
Age: 69
End: 2024-01-19
Payer: MEDICARE

## 2024-01-19 VITALS
RESPIRATION RATE: 16 BRPM | SYSTOLIC BLOOD PRESSURE: 115 MMHG | HEIGHT: 66 IN | HEART RATE: 78 BPM | TEMPERATURE: 98.1 F | BODY MASS INDEX: 27.93 KG/M2 | WEIGHT: 173.8 LBS | DIASTOLIC BLOOD PRESSURE: 65 MMHG

## 2024-01-19 DIAGNOSIS — I10 PRIMARY HYPERTENSION: ICD-10-CM

## 2024-01-19 DIAGNOSIS — F41.1 GENERALIZED ANXIETY DISORDER: Primary | Chronic | ICD-10-CM

## 2024-01-19 PROCEDURE — 3074F SYST BP LT 130 MM HG: CPT | Performed by: STUDENT IN AN ORGANIZED HEALTH CARE EDUCATION/TRAINING PROGRAM

## 2024-01-19 PROCEDURE — 3078F DIAST BP <80 MM HG: CPT | Performed by: STUDENT IN AN ORGANIZED HEALTH CARE EDUCATION/TRAINING PROGRAM

## 2024-01-19 PROCEDURE — 99213 OFFICE O/P EST LOW 20 MIN: CPT | Performed by: STUDENT IN AN ORGANIZED HEALTH CARE EDUCATION/TRAINING PROGRAM

## 2024-01-19 PROCEDURE — 1123F ACP DISCUSS/DSCN MKR DOCD: CPT | Performed by: STUDENT IN AN ORGANIZED HEALTH CARE EDUCATION/TRAINING PROGRAM

## 2024-01-19 PROCEDURE — 99213 OFFICE O/P EST LOW 20 MIN: CPT

## 2024-01-19 RX ORDER — ALPRAZOLAM 0.25 MG/1
0.25 TABLET ORAL ONCE
Qty: 1 TABLET | Refills: 0 | OUTPATIENT
Start: 2024-01-19 | End: 2024-01-19

## 2024-01-19 RX ORDER — MECLIZINE HYDROCHLORIDE 25 MG/1
25 TABLET ORAL 3 TIMES DAILY PRN
Qty: 180 TABLET | Refills: 0 | Status: SHIPPED | OUTPATIENT
Start: 2024-01-19 | End: 2024-04-18

## 2024-01-19 ASSESSMENT — PATIENT HEALTH QUESTIONNAIRE - PHQ9
SUM OF ALL RESPONSES TO PHQ QUESTIONS 1-9: 9
1. LITTLE INTEREST OR PLEASURE IN DOING THINGS: 1
SUM OF ALL RESPONSES TO PHQ9 QUESTIONS 1 & 2: 3
SUM OF ALL RESPONSES TO PHQ QUESTIONS 1-9: 9
SUM OF ALL RESPONSES TO PHQ QUESTIONS 1-9: 9
3. TROUBLE FALLING OR STAYING ASLEEP: 3
9. THOUGHTS THAT YOU WOULD BE BETTER OFF DEAD, OR OF HURTING YOURSELF: 0
5. POOR APPETITE OR OVEREATING: 0
10. IF YOU CHECKED OFF ANY PROBLEMS, HOW DIFFICULT HAVE THESE PROBLEMS MADE IT FOR YOU TO DO YOUR WORK, TAKE CARE OF THINGS AT HOME, OR GET ALONG WITH OTHER PEOPLE: 0
8. MOVING OR SPEAKING SO SLOWLY THAT OTHER PEOPLE COULD HAVE NOTICED. OR THE OPPOSITE, BEING SO FIGETY OR RESTLESS THAT YOU HAVE BEEN MOVING AROUND A LOT MORE THAN USUAL: 0
4. FEELING TIRED OR HAVING LITTLE ENERGY: 3
6. FEELING BAD ABOUT YOURSELF - OR THAT YOU ARE A FAILURE OR HAVE LET YOURSELF OR YOUR FAMILY DOWN: 0
7. TROUBLE CONCENTRATING ON THINGS, SUCH AS READING THE NEWSPAPER OR WATCHING TELEVISION: 0
SUM OF ALL RESPONSES TO PHQ QUESTIONS 1-9: 9
2. FEELING DOWN, DEPRESSED OR HOPELESS: 2

## 2024-01-19 NOTE — PROGRESS NOTES
Final    Sodium 12/19/2023 140  135 - 144 mmol/L Final    Potassium 12/19/2023 4.6  3.7 - 5.3 mmol/L Final    Chloride 12/19/2023 104  98 - 107 mmol/L Final    CO2 12/19/2023 27  20 - 31 mmol/L Final    Anion Gap 12/19/2023 9  9 - 17 mmol/L Final    Glucose 12/19/2023 91  70 - 99 mg/dL Final    BUN 12/19/2023 8  8 - 23 mg/dL Final    Creatinine 12/19/2023 0.7  0.5 - 0.9 mg/dL Final    Est, Glom Filt Rate 12/19/2023 >60  >60 mL/min/1.73m2 Final    Comment:       These results are not intended for use in patients <18 years of age.        eGFR results are calculated without a race factor using the 2021 CKD-EPI equation.  Careful clinical correlation is recommended, particularly when comparing to results   calculated using previous equations.  The CKD-EPI equation is less accurate in patients with extremes of muscle mass, extra-renal   metabolism of creatine, excessive creatine ingestion, or following therapy that affects   renal tubular secretion.      Calcium 12/19/2023 9.4  8.6 - 10.4 mg/dL Final    Total Protein 12/19/2023 6.6  6.4 - 8.3 g/dL Final    Albumin 12/19/2023 3.9  3.5 - 5.2 g/dL Final    Albumin/Globulin Ratio 12/19/2023 1.4  1.0 - 2.5 Final    Total Bilirubin 12/19/2023 0.3  0.3 - 1.2 mg/dL Final    Alkaline Phosphatase 12/19/2023 105 (H)  35 - 104 U/L Final    ALT 12/19/2023 6  5 - 33 U/L Final    AST 12/19/2023 15  <32 U/L Final        No results found for this visit on 01/19/24.     COMMUNICATION   Questions/concerns answered. Patient verbalized and expressed understanding. Medications, laboratory testing, imaging, consultation, and follow up as documented in this encounter.     Patient Instructions   Thank you for following up with us at [facility] outpatient residency clinic! It was a pleasure to meet you today!     Our plan is the following:  -Continue taking your medication Lisinopril as it is doing well  - I will continue to follow up with neurology       If you have any

## 2024-01-19 NOTE — PATIENT INSTRUCTIONS
Thank you for following up with us at [facility] outpatient residency clinic! It was a pleasure to meet you today!     Our plan is the following:  -Continue taking your medication Lisinopril as it is doing well  - I will continue to follow up with neurology       If you have any additional questions or concerns, please call the office ([phone]) and speak to one of the staff. They will triage and forward the message to the doctors! Have a great rest of your day!

## 2024-01-22 ENCOUNTER — HOSPITAL ENCOUNTER (OUTPATIENT)
Dept: MRI IMAGING | Age: 69
Discharge: HOME | End: 2024-01-24
Attending: NEUROLOGICAL SURGERY
Payer: MEDICARE

## 2024-01-22 DIAGNOSIS — D32.0 MENINGIOMA, CEREBRAL (HCC): ICD-10-CM

## 2024-01-22 PROCEDURE — 70551 MRI BRAIN STEM W/O DYE: CPT

## 2024-01-25 ENCOUNTER — OFFICE VISIT (OUTPATIENT)
Age: 69
End: 2024-01-25
Payer: MEDICARE

## 2024-01-25 VITALS — SYSTOLIC BLOOD PRESSURE: 123 MMHG | DIASTOLIC BLOOD PRESSURE: 75 MMHG | HEART RATE: 77 BPM

## 2024-01-25 DIAGNOSIS — D32.0 MENINGIOMA, CEREBRAL (HCC): Primary | ICD-10-CM

## 2024-01-25 PROCEDURE — 1123F ACP DISCUSS/DSCN MKR DOCD: CPT | Performed by: NEUROLOGICAL SURGERY

## 2024-01-25 PROCEDURE — 99214 OFFICE O/P EST MOD 30 MIN: CPT | Performed by: NEUROLOGICAL SURGERY

## 2024-01-25 PROCEDURE — 3078F DIAST BP <80 MM HG: CPT | Performed by: NEUROLOGICAL SURGERY

## 2024-01-25 PROCEDURE — 3074F SYST BP LT 130 MM HG: CPT | Performed by: NEUROLOGICAL SURGERY

## 2024-01-26 NOTE — PROGRESS NOTES
appears to be dural based overlying the left temporal lobe.  This is consistent with a meningioma.  The radiologist feels that it appears larger on the MRI compared to the CT.  I think this is possible, but I also think this may be due mostly to the different imaging modality.  Nevertheless, I think it would be prudent to repeat an MRI in about 6 months because of the concern for growth.  I will plan to have her back to the office at that time to review the results.  If it seems to be unchanged I would then plan to transition to annual surveillance imaging.  I think it is unlikely that she will experience any neurologic deficits as a result of this lesion, but if she were to have trouble it would most likely be related to speech.  I instructed her to contact me if she does develop any new problems prior to the planned follow-up.      Electronically signed by Parminder Block MD on 1/26/2024 at 1:01 AM    Please note that this chart was generated using voice recognition Dragon dictation software.  Although every effort was made to ensure the accuracy of this automated transcription, some errors in transcription may have occurred.

## 2024-03-08 ENCOUNTER — APPOINTMENT (OUTPATIENT)
Dept: CT IMAGING | Age: 69
End: 2024-03-08
Payer: MEDICARE

## 2024-03-08 ENCOUNTER — HOSPITAL ENCOUNTER (EMERGENCY)
Age: 69
Discharge: HOME | End: 2024-03-09
Attending: EMERGENCY MEDICINE
Payer: MEDICARE

## 2024-03-08 VITALS
WEIGHT: 172 LBS | HEART RATE: 69 BPM | SYSTOLIC BLOOD PRESSURE: 116 MMHG | TEMPERATURE: 97.9 F | HEIGHT: 66 IN | RESPIRATION RATE: 16 BRPM | OXYGEN SATURATION: 98 % | DIASTOLIC BLOOD PRESSURE: 70 MMHG | BODY MASS INDEX: 27.64 KG/M2

## 2024-03-08 DIAGNOSIS — R19.7 DIARRHEA, UNSPECIFIED TYPE: Primary | ICD-10-CM

## 2024-03-08 LAB
BASOPHILS # BLD: 0 K/UL (ref 0–0.2)
BASOPHILS NFR BLD: 0 % (ref 0–2)
EOSINOPHIL # BLD: 0.2 K/UL (ref 0–0.4)
EOSINOPHILS RELATIVE PERCENT: 2 % (ref 1–4)
ERYTHROCYTE [DISTWIDTH] IN BLOOD BY AUTOMATED COUNT: 14.4 % (ref 12.5–15.4)
HCT VFR BLD AUTO: 39.3 % (ref 36–46)
HGB BLD-MCNC: 13.3 G/DL (ref 12–16)
LYMPHOCYTES NFR BLD: 2.6 K/UL (ref 1–4.8)
LYMPHOCYTES RELATIVE PERCENT: 29 % (ref 24–44)
MCH RBC QN AUTO: 28.9 PG (ref 26–34)
MCHC RBC AUTO-ENTMCNC: 33.9 G/DL (ref 31–37)
MCV RBC AUTO: 85.5 FL (ref 80–100)
MONOCYTES NFR BLD: 0.5 K/UL (ref 0.1–1.2)
MONOCYTES NFR BLD: 6 % (ref 2–11)
NEUTROPHILS NFR BLD: 63 % (ref 36–66)
NEUTS SEG NFR BLD: 5.7 K/UL (ref 1.8–7.7)
PLATELET # BLD AUTO: 260 K/UL (ref 140–450)
PMV BLD AUTO: 8 FL (ref 6–12)
RBC # BLD AUTO: 4.6 M/UL (ref 4–5.2)
WBC OTHER # BLD: 9 K/UL (ref 3.5–11)

## 2024-03-08 PROCEDURE — 80053 COMPREHEN METABOLIC PANEL: CPT

## 2024-03-08 PROCEDURE — 85025 COMPLETE CBC W/AUTO DIFF WBC: CPT

## 2024-03-08 PROCEDURE — 99284 EMERGENCY DEPT VISIT MOD MDM: CPT

## 2024-03-08 PROCEDURE — 74176 CT ABD & PELVIS W/O CONTRAST: CPT

## 2024-03-08 ASSESSMENT — PAIN - FUNCTIONAL ASSESSMENT: PAIN_FUNCTIONAL_ASSESSMENT: NONE - DENIES PAIN

## 2024-03-09 LAB
ALBUMIN SERPL-MCNC: 4.2 G/DL (ref 3.5–5.2)
ALBUMIN/GLOB SERPL: 1.6 {RATIO} (ref 1–2.5)
ALP SERPL-CCNC: 105 U/L (ref 35–104)
ALT SERPL-CCNC: 6 U/L (ref 5–33)
ANION GAP SERPL CALCULATED.3IONS-SCNC: 12 MMOL/L (ref 9–17)
AST SERPL-CCNC: 16 U/L
BILIRUB SERPL-MCNC: 0.2 MG/DL (ref 0.3–1.2)
BILIRUB UR QL STRIP: NEGATIVE
BUN SERPL-MCNC: 8 MG/DL (ref 8–23)
CALCIUM SERPL-MCNC: 9.2 MG/DL (ref 8.6–10.4)
CHARACTER UR: ABNORMAL
CHLORIDE SERPL-SCNC: 102 MMOL/L (ref 98–107)
CLARITY UR: CLEAR
CO2 SERPL-SCNC: 25 MMOL/L (ref 20–31)
COLOR UR: YELLOW
CREAT SERPL-MCNC: 0.7 MG/DL (ref 0.5–0.9)
EPI CELLS #/AREA URNS HPF: ABNORMAL /HPF (ref 0–5)
GFR SERPL CREATININE-BSD FRML MDRD: >60 ML/MIN/1.73M2
GLUCOSE SERPL-MCNC: 133 MG/DL (ref 70–99)
HGB UR QL STRIP.AUTO: ABNORMAL
KETONES UR STRIP-MCNC: NEGATIVE MG/DL
LEUKOCYTE ESTERASE UR QL STRIP: NEGATIVE
NITRITE UR QL STRIP: NEGATIVE
PH UR STRIP: 6 [PH] (ref 5–8)
POTASSIUM SERPL-SCNC: 3.7 MMOL/L (ref 3.7–5.3)
PROT SERPL-MCNC: 6.9 G/DL (ref 6.4–8.3)
PROT UR STRIP-MCNC: NEGATIVE MG/DL
RBC #/AREA URNS HPF: ABNORMAL /HPF (ref 0–2)
SODIUM SERPL-SCNC: 139 MMOL/L (ref 135–144)
SP GR UR STRIP: 1 (ref 1–1.03)
UROBILINOGEN UR STRIP-ACNC: NORMAL EU/DL (ref 0–1)

## 2024-03-09 PROCEDURE — 81001 URINALYSIS AUTO W/SCOPE: CPT

## 2024-03-09 RX ORDER — LOPERAMIDE HYDROCHLORIDE 2 MG/1
2 CAPSULE ORAL 4 TIMES DAILY PRN
Status: DISCONTINUED | OUTPATIENT
Start: 2024-03-09 | End: 2024-03-09 | Stop reason: HOSPADM

## 2024-03-11 NOTE — ED PROVIDER NOTES
[facility] Emergency Department  [address]  [facility] [address]  Phone: [phone]  Fax: [phone]    EMERGENCY DEPARTMENT ENCOUNTER          Pt Name: Shey Gillette  MRN: [id number]  Birthdate 1955  Date of evaluation: 3/8/2024      CHIEF COMPLAINT       Chief Complaint   Patient presents with    Diarrhea     Ongoing since Monday-watery      Abdominal Pain     Generalized all over abd pain- onset 4 days described as intermittent sharp pain  Hx of diverticulitis  States stomach is upset but denies any vomiting       HISTORY OF PRESENT ILLNESS       Shey Gillette is a 68 y.o. female who presents with diarrhea for the last 4 days.  SHe has had multiple liquid stools several times throughout the day.  She denies fever or chills.  She has crampy abdominal pain the last 48 hours.  It is mainly when she has a BM.  She has a h/o divertculiti and is concerned is may be diverticulitis. Her stool are not black or with any gross blood.    REVIEW OF SYSTEMS       Review of Systems  Otherwise negative other than stated in HPI    PAST MEDICAL HISTORY    has a past medical history of Back pain, Chronic back pain, Chronic maxillary sinusitis, Diverticulitis of sigmoid colon, Generalized anxiety disorder, GERD (gastroesophageal reflux disease), Hyperlipidemia, Hypertension, benign, Hypokalemia, Lumbago with sciatica, right side, Major depression, chronic, Osteoporosis, Peripheral polyneuropathy, Pulmonary emphysema (HCC), Tobacco use disorder, continuous, and Vitamin D deficiency.    SURGICAL HISTORY      has a past surgical history that includes Colonoscopy (02/04/2011); Cholecystectomy (10/2006); Colonoscopy (N/A, 12/29/2017); Colonoscopy (12/29/2017); and skin biopsy (Left, 10/27/2020).    CURRENT MEDICATIONS       Discharge Medication List as of 3/9/2024  1:55 AM        CONTINUE these medications which have NOT CHANGED    Details   meclizine (ANTIVERT) 25 MG tablet Take 1 tablet  bromide monohydrate].    FAMILY HISTORY     She indicated that her mother is . She indicated that her father is . She indicated that two of her three brothers are alive. She indicated that the status of her maternal grandmother is unknown.     family history includes Alzheimer's Disease in her mother; COPD in her maternal grandmother; Cancer in her brother and brother; Cirrhosis in her brother; Emphysema in her maternal grandmother; Heart Disease in her father and mother; High Blood Pressure in her mother; Hypertension in her father; Stroke in her mother; Ulcerative Colitis in her mother.    SOCIAL HISTORY      reports that she has been smoking cigarettes. She started smoking about 48 years ago. She has a 48.2 pack-year smoking history. She has never used smokeless tobacco. She reports current alcohol use. She reports that she does not use drugs.    PHYSICAL EXAM     INITIAL VITALS:  height is 1.676 m (5' 6\") and weight is 78 kg (172 lb). Her oral temperature is 97.9 °F (36.6 °C). Her blood pressure is 116/70 and her pulse is 69. Her respiration is 16 and oxygen saturation is 98%.     Physical Exam  Vitals and nursing note reviewed.   Constitutional:       Appearance: Normal appearance.   HENT:      Head: Normocephalic and atraumatic.      Right Ear: External ear normal.      Left Ear: External ear normal.      Nose: Nose normal.      Mouth/Throat:      Mouth: Mucous membranes are moist.   Eyes:      Extraocular Movements: Extraocular movements intact.      Pupils: Pupils are equal, round, and reactive to light.   Cardiovascular:      Rate and Rhythm: Normal rate and regular rhythm.      Heart sounds: Normal heart sounds.   Pulmonary:      Effort: Pulmonary effort is normal.      Breath sounds: Normal breath sounds.   Abdominal:      General: Bowel sounds are normal.      Palpations: Abdomen is soft.      Tenderness: There is abdominal tenderness in the right lower quadrant and left lower quadrant.

## 2024-04-07 DIAGNOSIS — B96.89 ACUTE BACTERIAL RHINOSINUSITIS: ICD-10-CM

## 2024-04-07 DIAGNOSIS — J01.90 ACUTE BACTERIAL RHINOSINUSITIS: ICD-10-CM

## 2024-04-08 RX ORDER — DOXYCYCLINE HYCLATE 100 MG
TABLET ORAL
Qty: 14 TABLET | Refills: 0 | OUTPATIENT
Start: 2024-04-08

## 2024-04-10 NOTE — PROGRESS NOTES
[facility] Medicine Residency  [address] [place] [address]  Phone: [phone]  Fax: [phone]      Date of Visit: .TD  Patient Name: Shey Gillette   Patient :  1955     ASSESSMENT/PLAN     1. Primary hypertension   -BP well controlled, pt reports no side effects    -will continue with Lisinopril 20 mg   2. BMI 27.0-27.9,adult  3. Tobacco use disorder, continuous   -had a extensive conversations with pt about quitting smoking, pt reports she has thoughts lately about quitting and wants to. But isn't quite ready. She wants to try first at home with fake cigarettes to see if its truly the nicotine of the habit of grabbing something.    - provide patient with information, and will set up a separate appointment when she is ready to discuss and form a plan for quitting.    -reports when stressed and anxious smokes 2 packs a day when not she smokes about 1 pack  4. Pulmonary emphysema, unspecified emphysema type (HCC)  -   Refilled medications:   albuterol sulfate HFA (PROVENTIL HFA) 108 (90 Base) MCG/ACT inhaler; Inhale 1 puff into the lungs every 4 hours as needed for Wheezing, Disp-3 g, R-3Normal  -     Respiratory Therapy Supplies (NEBULIZER/TUBING/MOUTHPIECE) KIT; DAILY Starting 2024, Until Sat 2024, For 1 day, Disp-1 kit, R-0, Print  5. Chronic obstructive pulmonary disease, unspecified COPD type (HCC)  -     Nebulizers (COMPRESSOR/NEBULIZER) MISC; DAILY Starting 2024, Disp-1 each, R-0, Print  6. Rhinorrhea  -    Like a postviral infection, advise patient to take:  ipratropium (ATROVENT) 0.03 % nasal spray; 2 sprays by Each Nostril route in the morning and 2 sprays in the evening   -denies fevers, abdominal pain, nausea or vomiting.        Return in about 6 months (around 10/12/2024).    HPI    Shey Gillette is a 68 y.o. female who presents today to discuss   Chief Complaint   Patient presents with    Anxiety    Hypertension

## 2024-04-12 ENCOUNTER — OFFICE VISIT (OUTPATIENT)
Age: 69
End: 2024-04-12
Payer: MEDICARE

## 2024-04-12 VITALS
DIASTOLIC BLOOD PRESSURE: 60 MMHG | HEART RATE: 59 BPM | WEIGHT: 172.2 LBS | RESPIRATION RATE: 16 BRPM | BODY MASS INDEX: 27.68 KG/M2 | HEIGHT: 66 IN | TEMPERATURE: 98.1 F | SYSTOLIC BLOOD PRESSURE: 124 MMHG

## 2024-04-12 DIAGNOSIS — J44.9 CHRONIC OBSTRUCTIVE PULMONARY DISEASE, UNSPECIFIED COPD TYPE (HCC): ICD-10-CM

## 2024-04-12 DIAGNOSIS — F17.209 TOBACCO USE DISORDER, CONTINUOUS: Chronic | ICD-10-CM

## 2024-04-12 DIAGNOSIS — J34.89 RHINORRHEA: ICD-10-CM

## 2024-04-12 DIAGNOSIS — I10 PRIMARY HYPERTENSION: Primary | ICD-10-CM

## 2024-04-12 DIAGNOSIS — J43.9 PULMONARY EMPHYSEMA, UNSPECIFIED EMPHYSEMA TYPE (HCC): ICD-10-CM

## 2024-04-12 PROCEDURE — 99213 OFFICE O/P EST LOW 20 MIN: CPT

## 2024-04-12 PROCEDURE — 99213 OFFICE O/P EST LOW 20 MIN: CPT | Performed by: FAMILY MEDICINE

## 2024-04-12 PROCEDURE — 3078F DIAST BP <80 MM HG: CPT | Performed by: FAMILY MEDICINE

## 2024-04-12 PROCEDURE — 1123F ACP DISCUSS/DSCN MKR DOCD: CPT | Performed by: FAMILY MEDICINE

## 2024-04-12 PROCEDURE — 3074F SYST BP LT 130 MM HG: CPT | Performed by: FAMILY MEDICINE

## 2024-04-12 RX ORDER — NEBULIZER ACCESSORIES
1 KIT MISCELLANEOUS DAILY
Qty: 1 KIT | Refills: 0 | Status: SHIPPED | OUTPATIENT
Start: 2024-04-12 | End: 2024-04-13

## 2024-04-12 RX ORDER — ALBUTEROL SULFATE 90 UG/1
1 AEROSOL, METERED RESPIRATORY (INHALATION) EVERY 4 HOURS PRN
Qty: 3 G | Refills: 3 | Status: SHIPPED | OUTPATIENT
Start: 2024-04-12

## 2024-04-12 RX ORDER — IPRATROPIUM BROMIDE 21 UG/1
2 SPRAY, METERED NASAL EVERY 12 HOURS
Qty: 120 ML | Refills: 3 | Status: SHIPPED | OUTPATIENT
Start: 2024-04-12

## 2024-04-12 ASSESSMENT — PATIENT HEALTH QUESTIONNAIRE - PHQ9
SUM OF ALL RESPONSES TO PHQ QUESTIONS 1-9: 2
5. POOR APPETITE OR OVEREATING: NOT AT ALL
10. IF YOU CHECKED OFF ANY PROBLEMS, HOW DIFFICULT HAVE THESE PROBLEMS MADE IT FOR YOU TO DO YOUR WORK, TAKE CARE OF THINGS AT HOME, OR GET ALONG WITH OTHER PEOPLE: NOT DIFFICULT AT ALL
SUM OF ALL RESPONSES TO PHQ QUESTIONS 1-9: 2
3. TROUBLE FALLING OR STAYING ASLEEP: SEVERAL DAYS
SUM OF ALL RESPONSES TO PHQ QUESTIONS 1-9: 2
8. MOVING OR SPEAKING SO SLOWLY THAT OTHER PEOPLE COULD HAVE NOTICED. OR THE OPPOSITE, BEING SO FIGETY OR RESTLESS THAT YOU HAVE BEEN MOVING AROUND A LOT MORE THAN USUAL: NOT AT ALL
SUM OF ALL RESPONSES TO PHQ QUESTIONS 1-9: 2
7. TROUBLE CONCENTRATING ON THINGS, SUCH AS READING THE NEWSPAPER OR WATCHING TELEVISION: NOT AT ALL
2. FEELING DOWN, DEPRESSED OR HOPELESS: NOT AT ALL
1. LITTLE INTEREST OR PLEASURE IN DOING THINGS: NOT AT ALL
4. FEELING TIRED OR HAVING LITTLE ENERGY: SEVERAL DAYS
9. THOUGHTS THAT YOU WOULD BE BETTER OFF DEAD, OR OF HURTING YOURSELF: NOT AT ALL
6. FEELING BAD ABOUT YOURSELF - OR THAT YOU ARE A FAILURE OR HAVE LET YOURSELF OR YOUR FAMILY DOWN: NOT AT ALL
SUM OF ALL RESPONSES TO PHQ9 QUESTIONS 1 & 2: 0

## 2024-04-12 ASSESSMENT — ANXIETY QUESTIONNAIRES
IF YOU CHECKED OFF ANY PROBLEMS ON THIS QUESTIONNAIRE, HOW DIFFICULT HAVE THESE PROBLEMS MADE IT FOR YOU TO DO YOUR WORK, TAKE CARE OF THINGS AT HOME, OR GET ALONG WITH OTHER PEOPLE: NOT DIFFICULT AT ALL
4. TROUBLE RELAXING: NOT AT ALL
3. WORRYING TOO MUCH ABOUT DIFFERENT THINGS: NOT AT ALL
7. FEELING AFRAID AS IF SOMETHING AWFUL MIGHT HAPPEN: NOT AT ALL
2. NOT BEING ABLE TO STOP OR CONTROL WORRYING: NOT AT ALL
6. BECOMING EASILY ANNOYED OR IRRITABLE: NOT AT ALL
GAD7 TOTAL SCORE: 0
1. FEELING NERVOUS, ANXIOUS, OR ON EDGE: NOT AT ALL
5. BEING SO RESTLESS THAT IT IS HARD TO SIT STILL: NOT AT ALL

## 2024-04-12 NOTE — PATIENT INSTRUCTIONS
Thank you for following up with us at [facility] outpatient residency clinic! It was a pleasure to meet you today!     Our plan is the following:  - as discussed nasal spray 2 sprays in each nostrils and that's it   - continue taking your medications as prescribed     If you have any additional questions or concerns, please call the office ([phone]) and speak to one of the staff. They will triage and forward the message to the doctors! Have a great rest of your day!

## 2024-05-24 ENCOUNTER — OFFICE VISIT (OUTPATIENT)
Age: 69
End: 2024-05-24
Payer: MEDICARE

## 2024-05-24 VITALS
DIASTOLIC BLOOD PRESSURE: 72 MMHG | BODY MASS INDEX: 26.79 KG/M2 | HEART RATE: 82 BPM | TEMPERATURE: 98 F | RESPIRATION RATE: 16 BRPM | WEIGHT: 166 LBS | SYSTOLIC BLOOD PRESSURE: 145 MMHG

## 2024-05-24 DIAGNOSIS — K21.9 GASTROESOPHAGEAL REFLUX DISEASE, UNSPECIFIED WHETHER ESOPHAGITIS PRESENT: Primary | ICD-10-CM

## 2024-05-24 DIAGNOSIS — F17.200 CURRENT SMOKER: ICD-10-CM

## 2024-05-24 DIAGNOSIS — F32.1 CURRENT MODERATE EPISODE OF MAJOR DEPRESSIVE DISORDER, UNSPECIFIED WHETHER RECURRENT (HCC): ICD-10-CM

## 2024-05-24 PROCEDURE — 3077F SYST BP >= 140 MM HG: CPT | Performed by: FAMILY MEDICINE

## 2024-05-24 PROCEDURE — 1123F ACP DISCUSS/DSCN MKR DOCD: CPT | Performed by: FAMILY MEDICINE

## 2024-05-24 PROCEDURE — 99214 OFFICE O/P EST MOD 30 MIN: CPT | Performed by: FAMILY MEDICINE

## 2024-05-24 PROCEDURE — 3078F DIAST BP <80 MM HG: CPT | Performed by: FAMILY MEDICINE

## 2024-05-24 RX ORDER — PANTOPRAZOLE SODIUM 40 MG/1
40 TABLET, DELAYED RELEASE ORAL
Qty: 90 TABLET | Refills: 1 | Status: SHIPPED | OUTPATIENT
Start: 2024-05-24

## 2024-05-24 NOTE — PROGRESS NOTES
[facility] Residency  [address] [place] [address]  Phone: [phone]  Fax: [phone]      Date of Visit: .TD  Patient Name: Shey Gillette   Patient :  1955     ASSESSMENT/PLAN   1. Gastroesophageal reflux disease, unspecified whether esophagitis present  -    Referral to  Nic Lambert MD, Gastroenterology, [place]  -   Started patient on   pantoprazole (PROTONIX) 40 MG tablet; Take 1 tablet by mouth every morning (before breakfast), Disp-90 tablet, R-1Normal  2. Current moderate episode of major depressive disorder, unspecified whether recurrent (HCC)  -     [facility]  3. Current smoker    -Advised pt to stop smoking as this can increase her risk of developing esophageal cancer as other cancers.     Return if symptoms worsen or fail to improve, cant get into GI office sooner.    HPI    Shey Gillette is a 68 y.o. female who presents today to discuss   Chief Complaint   Patient presents with    Follow-up     GERD f/u-  Not doing well     Patient presents today with complaints of acid reflux.  Patient's reports that she was diagnosed years ago moving around 2017 with GERD and has tried Prilosec which eventually stopped working for her.  Then patient was switched off to esomeprazole which has been taking for about a year and a half now and patient says that for the last 2 months that she has no longer had any symptom relief with medications so she stopped taking that.  Patient has not had an endoscopy done in years.  Expressed to patient the importance about getting an endoscopy done, to make sure were not masking symptoms so with possible complications in the future such as Nicolas's esophagus and ultimately cancer.  Patient is aware and was also spoken about the dieting and foods to prevent such as spicy foods and also the use of NSAIDs.  Patient is also very emotional during office

## 2024-05-24 NOTE — PATIENT INSTRUCTIONS
Thank you for following up with us at [facility] outpatient residency clinic! It was a pleasure to meet you today!     Our plan is the following:  - please  medication from pharmacy  - please call GI to schedule appointment and get endoscopy done, I will follow the results     If you have any additional questions or concerns, please call the office ([phone]) and speak to one of the staff. They will triage and forward the message to the doctors! Have a great rest of your day!

## 2024-05-27 ASSESSMENT — ENCOUNTER SYMPTOMS
CHEST TIGHTNESS: 0
NAUSEA: 0
SHORTNESS OF BREATH: 0
COLOR CHANGE: 0
ANAL BLEEDING: 0
DIARRHEA: 0
ABDOMINAL DISTENTION: 0
BLOOD IN STOOL: 0
ABDOMINAL PAIN: 1
COUGH: 0
VOMITING: 0
CONSTIPATION: 0

## 2024-05-28 ENCOUNTER — TELEPHONE (OUTPATIENT)
Age: 69
End: 2024-05-28

## 2024-05-28 NOTE — TELEPHONE ENCOUNTER
Patient called and stated that her hemorrhoids are very swollen and this past weekend they were bleeding, she stated that she did purchased some wipes and some cream and that she has been taking sids baths three times a day which is helping some but she is unable to have a bowel movement due to them being so swollen. Wanted to know if she can get a steroid cream called to her pharmacy.

## 2024-06-02 ENCOUNTER — APPOINTMENT (OUTPATIENT)
Dept: GENERAL RADIOLOGY | Age: 69
End: 2024-06-02
Payer: MEDICARE

## 2024-06-02 ENCOUNTER — HOSPITAL ENCOUNTER (EMERGENCY)
Age: 69
Discharge: HOME | End: 2024-06-02
Attending: EMERGENCY MEDICINE
Payer: MEDICARE

## 2024-06-02 VITALS
WEIGHT: 162 LBS | SYSTOLIC BLOOD PRESSURE: 116 MMHG | TEMPERATURE: 98.4 F | HEIGHT: 66 IN | DIASTOLIC BLOOD PRESSURE: 75 MMHG | OXYGEN SATURATION: 95 % | HEART RATE: 78 BPM | RESPIRATION RATE: 18 BRPM | BODY MASS INDEX: 26.03 KG/M2

## 2024-06-02 DIAGNOSIS — K64.4 EXTERNAL HEMORRHOIDS: Primary | ICD-10-CM

## 2024-06-02 PROCEDURE — 74019 RADEX ABDOMEN 2 VIEWS: CPT

## 2024-06-02 PROCEDURE — 99283 EMERGENCY DEPT VISIT LOW MDM: CPT

## 2024-06-02 RX ORDER — HYDROCORTISONE 25 MG/G
CREAM TOPICAL
Qty: 28 G | Refills: 1 | Status: SHIPPED | OUTPATIENT
Start: 2024-06-02

## 2024-06-02 ASSESSMENT — PAIN DESCRIPTION - LOCATION: LOCATION: RECTUM

## 2024-06-02 ASSESSMENT — PAIN - FUNCTIONAL ASSESSMENT: PAIN_FUNCTIONAL_ASSESSMENT: 0-10

## 2024-06-02 ASSESSMENT — PAIN SCALES - GENERAL: PAINLEVEL_OUTOF10: 7

## 2024-06-02 ASSESSMENT — PAIN DESCRIPTION - PAIN TYPE: TYPE: ACUTE PAIN

## 2024-06-02 ASSESSMENT — PAIN DESCRIPTION - DESCRIPTORS: DESCRIPTORS: DISCOMFORT;SORE

## 2024-06-02 NOTE — ED TRIAGE NOTES
Patient has not had a normal bowel movement in 9 days. Patient has tried laxatives, enemas and stool softeners. Patient stated she has hemorrhoids. Bright red blood from rectum.

## 2024-06-03 NOTE — ED PROVIDER NOTES
[facility] EMERGENCY DEPARTMENT  eMERGENCY dEPARTMENT eNCOUnter   Independent Attestation     Pt Name: Shey Gillette  MRN: [id number]  Birthdate 1955  Date of evaluation: 6/2/24       Shey Gillette is a 68 y.o. female who presents with Constipation        Based on the medical record, the care appears appropriate. I was personally available for consultation in the Emergency Department.    Manju Fernandes DO  Attending Emergency  Physician               Manju Fernandes DO  06/02/24 [id number]

## 2024-06-03 NOTE — ED PROVIDER NOTES
[facility] Emergency Department  [address]  [facility] [address]  Phone: [phone]  Fax: [phone]        Pt Name: Shey Gillette  MRN: [id number]  Birthdate 1955  Date of evaluation: 6/3/24    CHIEFCOMPLAINT       Chief Complaint   Patient presents with    Constipation       HISTORY OF PRESENT ILLNESS (Location/Symptom, Timing/Onset, Context/Setting, Quality, Duration, Modifying Factors, Severity)      Shey Gillette is a 68 y.o. female with no pertinent PMH who presents to the ED via private auto with constipation concerns and hemorrhoid.  Patient reports that she has severe GERD which she has treated her whole life.  She did stop taking her medication for the past couple months because she did not think it was working.  She has not restarted any medications but her GERD has gradually worsened so she did restart Protonix, 8 or 9 days ago.  She does mention decreased appetite and has not been eating or drinking as much due to the gastritis/GERD.  She does note that it has been slowly improving with the medication.  She is primarily concerned because usually she does bowel movements every day however she has not been having regular bowel movements since starting the Protonix and was concerned that maybe this was causing constipation.  She has been taking MiraLAX.  The bowel movement she has been having are very soft.  Denies any rectal pressure.  She does mention that she has been straining really hard because she thinks she has to have a BM especially when she gets stimulated rectally with sitz bath's.  This has exacerbated her hemorrhoids and the sitz bath's to help them.  She has also been doing topical hydrocortisone/Preparation H.  Denies noting any exacerbating relieving factors.  Denies any fever, chills, abdominal pain, chest pain, shortness of breath, dark red stools/melena, flank pain, urinary complaints, vomiting, or any other concerns at this time.    PAST

## 2024-06-03 NOTE — DISCHARGE INSTRUCTIONS
Please understand that at this time there is no evidence for a more serious underlying process, but that early in the process of an illness or injury, an emergency department workup can be falsely reassuring.  You should contact your family doctor within the next 24 hours for a follow up appointment.    PLEASE RETURN TO THE EMERGENCY DEPARTMENT IMMEDIATELY if your symptoms worsen in anyway or in 8-12 hours if not improved for re-evaluation.  You should immediately return to the ER for symptoms such as increasing pain, bloody stool, fever, a feeling of passing out, light headed, dizziness, chest pain, shortness of breath, persistent nausea and/or vomiting, numbness or weakness to the arms or legs, coolness or color change of the arms or legs.      THANK YOU!!!    From [facility] and [facility]    On behalf of the Emergency Department staff at [facility], I would like to thank you for giving us the opportunity to address your health care needs and concerns.    We hope that during your visit, our service was delivered in a professional and caring manner. Please keep [facility] in mind as we walk with you down the path to your own personal wellness.     Please expect an automated text message or email from us so we can ask a few questions about your health and progress. Based on your answers, a clinician may call you back to offer help and instructions.    Please understand that early in the process of an illness or injury, an emergency department workup can be falsely reassuring.  If you notice any worsening, changing or persistent symptoms please call your family doctor or return to the ER immediately.     Tell us how we did during your visit at [email or web address]   and let us know about your experience

## 2024-06-13 ENCOUNTER — HOSPITAL ENCOUNTER (EMERGENCY)
Age: 69
Discharge: HOME | End: 2024-06-13
Attending: EMERGENCY MEDICINE
Payer: MEDICARE

## 2024-06-13 VITALS
HEIGHT: 66 IN | OXYGEN SATURATION: 97 % | DIASTOLIC BLOOD PRESSURE: 58 MMHG | HEART RATE: 80 BPM | WEIGHT: 164 LBS | RESPIRATION RATE: 16 BRPM | BODY MASS INDEX: 26.36 KG/M2 | SYSTOLIC BLOOD PRESSURE: 126 MMHG | TEMPERATURE: 99 F

## 2024-06-13 DIAGNOSIS — H11.32 SUBCONJUNCTIVAL BLEED, LEFT: Primary | ICD-10-CM

## 2024-06-13 PROCEDURE — 99282 EMERGENCY DEPT VISIT SF MDM: CPT

## 2024-06-13 ASSESSMENT — PAIN - FUNCTIONAL ASSESSMENT: PAIN_FUNCTIONAL_ASSESSMENT: 0-10

## 2024-06-13 ASSESSMENT — PAIN DESCRIPTION - PAIN TYPE: TYPE: ACUTE PAIN

## 2024-06-13 ASSESSMENT — PAIN DESCRIPTION - LOCATION: LOCATION: EYE

## 2024-06-13 ASSESSMENT — PAIN DESCRIPTION - ORIENTATION: ORIENTATION: LEFT

## 2024-06-13 NOTE — ED PROVIDER NOTES
EMERGENCY DEPARTMENT ENCOUNTER      Pt Name: Shey Gillette  MRN: [id number]  Birthdate 1955  Date of evaluation: 6/13/2024  Provider: Bebo Londono PA-C    CHIEF COMPLAINT       Chief Complaint   Patient presents with    Eye Pain     Left eye pain and burning Sun/Mon- sclera reddened w/ blood on Tuesday - had been rubbing eye. No drainage to eye. Unable to see eye doctor. No blood thinner         HISTORY OF PRESENT ILLNESS      Shey Gillette is a 68 y.o. female who presents to the emergency department with daughter with complaints of redness in her medial left thigh.  Denies any injury, states that she woke up, she must of scratched her eye during sleep and noticed redness to the eye.  Was unable to get into her eye doctor and came to the emergency for evaluation.  She denies any pain or any change in vision or any other complaint at this time.        REVIEW OF SYSTEMS       Review of Systems   AS STATED IN HPI      PAST MEDICAL HISTORY     Past Medical History:   Diagnosis Date    Back pain     Chronic back pain     Chronic maxillary sinusitis     Diverticulitis of sigmoid colon     Generalized anxiety disorder 07/23/2015    GERD (gastroesophageal reflux disease)     Hyperlipidemia     Hypertension, benign 07/23/2015    Hypokalemia 08/18/2023    seen on CMP from encounter on8/18/2023    Lumbago with sciatica, right side     Major depression, chronic 08/27/2015    Osteoporosis     Peripheral polyneuropathy     Pulmonary emphysema (HCC)     Tobacco use disorder, continuous 07/23/2015    Vitamin D deficiency          SURGICAL HISTORY       Past Surgical History:   Procedure Laterality Date    CHOLECYSTECTOMY  10/2006    COLONOSCOPY  02/04/2011    small hemorrhoids, no lesions seen    COLONOSCOPY N/A 12/29/2017    COLONOSCOPY WITH BIOPSY performed by Leno Ball MD at [facility] OR    COLONOSCOPY  12/29/2017    Few small diverticuli.  No ileitis.  No signs of colitis    SKIN BIOPSY Left 10/27/2020     Emphysema Maternal Grandmother     COPD Maternal Grandmother     Cancer Sister           SOCIAL HISTORY       Social History     Tobacco Use    Smoking status: Every Day     Current packs/day: 1.00     Average packs/day: 1.1 packs/day for 64.3 years (72.3 ttl pk-yrs)     Types: Cigarettes     Start date: 1/1/1976    Smokeless tobacco: Never   Vaping Use    Vaping Use: Never used   Substance Use Topics    Alcohol use: Yes     Comment: Occasionally,   no more than one drink.    Drug use: No         PHYSICAL EXAM       ED Triage Vitals [06/13/24 1843]   BP Temp Temp Source Pulse Respirations SpO2 Height Weight - Scale   (!) 126/58 99 °F (37.2 °C) Oral 80 16 97 % 1.676 m (5' 6\") 74.4 kg (164 lb)       Physical Exam   Nursing note and vitals reviewed.  Constitutional: Oriented to person, place, and time and well-developed, well-nourished.   Head: Normocephalic and atraumatic.   Ear: External ears normal.   Nose: Nose normal and midline.   Eyes: Conjunctivae and EOM are normal. Pupils are equal, round, and reactive to light.  Subconjunctival hemorrhage of left sclera/conjunctiva, no discharge  Neck: Normal range of motion.   Abdominal: Soft. NT/ND Bowel sounds are normal. There is no rebound and no guarding.   Musculoskeletal: Normal range of motion.   Neurological: Alert and oriented to person, place, and time. GCS score is 15.   Skin: Skin is warm and dry. No rash noted. No erythema. No pallor.   Psychiatric: Mood, memory, affect and judgment normal.       All other labs were within normal range or not returned as of this dictation.    EMERGENCY DEPARTMENT COURSE and DIFFERENTIAL DIAGNOSIS/MDM:       1)  Number and Complexity of Problems  Problem List This Visit:   Chief Complaint   Patient presents with    Eye Pain     Left eye pain and burning Sun/Mon- sclera reddened w/ blood on Tuesday - had been rubbing eye. No drainage to eye. Unable to see eye doctor. No blood thinner            3)  Treatment and

## 2024-06-14 ENCOUNTER — INITIAL CONSULT (OUTPATIENT)
Age: 69
End: 2024-06-14
Payer: MEDICARE

## 2024-06-14 DIAGNOSIS — F41.1 GENERALIZED ANXIETY DISORDER: ICD-10-CM

## 2024-06-14 DIAGNOSIS — F32.1 CURRENT MODERATE EPISODE OF MAJOR DEPRESSIVE DISORDER, UNSPECIFIED WHETHER RECURRENT (HCC): Primary | ICD-10-CM

## 2024-06-14 DIAGNOSIS — Z63.0 STRESS DUE TO MARITAL PROBLEMS: ICD-10-CM

## 2024-06-14 PROCEDURE — 90791 PSYCH DIAGNOSTIC EVALUATION: CPT | Performed by: PSYCHOLOGIST

## 2024-06-14 PROCEDURE — 1123F ACP DISCUSS/DSCN MKR DOCD: CPT | Performed by: PSYCHOLOGIST

## 2024-06-14 SDOH — SOCIAL STABILITY - SOCIAL INSECURITY: PROBLEMS IN RELATIONSHIP WITH SPOUSE OR PARTNER: Z63.0

## 2024-06-14 NOTE — ED PROVIDER NOTES
[facility] Emergency Department  [address]  [place] [facility] [address]  Phone: [phone]  Fax: [phone]      Pt Name: Shey Gillette  MRN: [id number]  Birthdate 1955  Date of evaluation: 6/14/24    CHIEF COMPLAINT       Chief Complaint   Patient presents with    Eye Pain     Left eye pain and burning Sun/Mon- sclera reddened w/ blood on Tuesday - had been rubbing eye. No drainage to eye. Unable to see eye doctor. No blood thinner       PAST MEDICAL HISTORY    has a past medical history of Back pain, Chronic back pain, Chronic maxillary sinusitis, Diverticulitis of sigmoid colon, Generalized anxiety disorder, GERD (gastroesophageal reflux disease), Hyperlipidemia, Hypertension, benign, Hypokalemia, Lumbago with sciatica, right side, Major depression, chronic, Osteoporosis, Peripheral polyneuropathy, Pulmonary emphysema (HCC), Tobacco use disorder, continuous, and Vitamin D deficiency.    SURGICAL HISTORY      has a past surgical history that includes Colonoscopy (02/04/2011); Cholecystectomy (10/2006); Colonoscopy (N/A, 12/29/2017); Colonoscopy (12/29/2017); and skin biopsy (Left, 10/27/2020).    CURRENT MEDICATIONS       Discharge Medication List as of 6/13/2024  7:05 PM        CONTINUE these medications which have NOT CHANGED    Details   hydrocortisone (ANUSOL-HC) 2.5 % CREA rectal cream APPLY LIBERALLY TWICE A DAY FOR 2 WEEKS, Disp-28 g, R-1, Normal      hydrocortisone 2.5 % cream Apply topically 2 times daily., Disp-1 each, R-0, Normal      pantoprazole (PROTONIX) 40 MG tablet Take 1 tablet by mouth every morning (before breakfast), Disp-90 tablet, R-1Normal      albuterol sulfate HFA (PROVENTIL HFA) 108 (90 Base) MCG/ACT inhaler Inhale 1 puff into the lungs every 4 hours as needed for Wheezing, Disp-3 g, R-3Normal      Nebulizers (COMPRESSOR/NEBULIZER) MISC DAILY Starting Fri 4/12/2024, Disp-1 each, R-0, Print      Respiratory Therapy Supplies (NEBULIZER/TUBING/MOUTHPIECE) KIT  DAILY Starting Fri 4/12/2024, Until Sat 4/13/2024, For 1 day, Disp-1 kit, R-0, Print      albuterol (PROVENTIL) (2.5 MG/3ML) 0.083% nebulizer solution Take 3 mLs by nebulization 4 times daily as needed for Wheezing or Shortness of Breath, Disp-120 each, R-0Normal      ipratropium 0.5 mg-albuterol 2.5 mg (DUONEB) 0.5-2.5 (3) MG/3ML SOLN nebulizer solution Inhale 3 mLs into the lungs every 4 hours as needed for Shortness of Breath, Disp-360 mL, R-1Normal      Blood Pressure Monitoring (BLOOD PRESSURE CUFF) MISC DAILY Starting Wed 12/20/2023, Disp-1 each, R-0, Print      rosuvastatin (CRESTOR) 20 MG tablet TAKE 1 TABLET BY MOUTH DAILY, Disp-100 tablet, R-2Please send a replace/new response with 100-Day Supply if appropriate to maximize member benefit. Requesting 1 year supply.Normal      lisinopril (PRINIVIL;ZESTRIL) 20 MG tablet TAKE 1 TABLET BY MOUTH DAILY, Disp-100 tablet, R-2Please send a replace/new response with 100-Day Supply if appropriate to maximize member benefit. Requesting 1 year supply.Normal      ascorbic acid (VITAMIN C) 500 MG tablet SometimesHistorical Med      fluticasone (FLONASE) 50 MCG/ACT nasal spray 1 spray by Nasal route daily as neededHistorical Med      vitamin D (CHOLECALCIFEROL) 25 MCG (1000 UT) TABS tablet Take 1 tablet by mouth dailyHistorical Med             ALLERGIES     is allergic to sulfa antibiotics, azithromycin, cefaclor, levofloxacin, penicillins, and spiriva respimat [tiotropium bromide monohydrate].    Vitals:    06/13/24 1843   BP: (!) 126/58   Pulse: 80   Resp: 16   Temp: 99 °F (37.2 °C)   TempSrc: Oral   SpO2: 97%   Weight: 74.4 kg (164 lb)   Height: 1.676 m (5' 6\")            FINAL IMPRESSION      1. Subconjunctival bleed, left          DISPOSITION/PLAN   DISPOSITION Decision To Discharge 06/13/2024 07:00:18 PM        PATIENT REFERRED TO:  Torrey Lopez MD  [address] [id number] [phone]    In 1 week      [facility]

## 2024-06-17 NOTE — PROGRESS NOTES
Decreased alertness and unable to be aroused for adequate participation in skilled OT training on this date. OT will continue as tolerated.    patterns, and the ability to use insight to inform behavior change.   Attention span and concentration appear within functional limits  Language use and knowledge base appear within functional limits      FAMILY MEDICAL/MH HISTORY   Her family history includes Alzheimer's Disease in her mother; COPD in her maternal grandmother; Cancer in her brother, brother, and sister; Cirrhosis in her brother; Emphysema in her maternal grandmother; Heart Disease in her father and mother; High Blood Pressure in her mother; Hypertension in her father; Stroke in her mother; Ulcerative Colitis in her mother.    PATIENT MENTAL HEALTH HISTORY  Possibly depression    PSYCHOSOCIAL HISTORY  Current living situation: with      Work/Education: retired nurse    Support system: sister    Baptism/Spirituality: needs assessment    DRUG AND ALCOHOL CURRENT USE/HISTORY  TOBACCO:  She reports that she has been smoking cigarettes. She started smoking about 48 years ago. She has a 72.3 pack-year smoking history. She has never used smokeless tobacco.  ALCOHOL:  She reports current alcohol use.  OTHER SUBSTANCES: She reports no history of drug use.     ASSESSMENT  Shey Gillette presented to the appointment today for evaluation and treatment of symptoms of depression/marital problems.   She will also benefit from brief and solution-focused consultation to address cognitive and behavioral interventions for depression/marital problems symptoms.        DIAGNOSIS    ICD-10-CM    1. Current moderate episode of major depressive disorder, unspecified whether recurrent (HCC)  F32.1       2. Generalized anxiety disorder  F41.1       3. Stress due to marital problems  Z63.0              INTERVENTION  Established rapport and Supportive techniques, discussed and processed events leading to her feelings of disenchantment with her marriage      PLAN  Will meet in 3 weeks to continue assisting with decision-making    Electronically signed by Dangelo WREN

## 2024-07-08 ENCOUNTER — TELEPHONE (OUTPATIENT)
Age: 69
End: 2024-07-08

## 2024-07-08 DIAGNOSIS — F40.240 CLAUSTROPHOBIA: Primary | ICD-10-CM

## 2024-07-08 NOTE — TELEPHONE ENCOUNTER
Patient called office nurse line requesting xanax Rx prior to scheduled MRI on 7/22/24.    Per [facility] patient does have MRI brain scheduled for 7/22/24 at [facility]. F/u appt scheduled for 8/8/24.     Per 1/16/24 TE pt cannot take valium or ativan-states she doesn't tolerate them well-\"they make me too loopy, I always have xanax and this works for me\"    Patient requests Xanax be sent to [facility] [place].      [facility] checked and shows xanax 0.25mg #1 x 1 last filled on 1/18/24.    Dr. Umair fritz for xanax Rx? TY.

## 2024-07-10 ENCOUNTER — OFFICE VISIT (OUTPATIENT)
Age: 69
End: 2024-07-10
Payer: MEDICARE

## 2024-07-10 VITALS
OXYGEN SATURATION: 99 % | RESPIRATION RATE: 18 BRPM | HEART RATE: 75 BPM | BODY MASS INDEX: 26.42 KG/M2 | SYSTOLIC BLOOD PRESSURE: 114 MMHG | DIASTOLIC BLOOD PRESSURE: 54 MMHG | TEMPERATURE: 98 F | WEIGHT: 163.7 LBS

## 2024-07-10 DIAGNOSIS — B96.89 ACUTE BACTERIAL SINUSITIS: Primary | ICD-10-CM

## 2024-07-10 DIAGNOSIS — F17.200 CURRENT SMOKER: ICD-10-CM

## 2024-07-10 DIAGNOSIS — J01.90 ACUTE BACTERIAL SINUSITIS: Primary | ICD-10-CM

## 2024-07-10 PROCEDURE — 1123F ACP DISCUSS/DSCN MKR DOCD: CPT | Performed by: FAMILY MEDICINE

## 2024-07-10 PROCEDURE — 99213 OFFICE O/P EST LOW 20 MIN: CPT | Performed by: FAMILY MEDICINE

## 2024-07-10 PROCEDURE — 3074F SYST BP LT 130 MM HG: CPT | Performed by: FAMILY MEDICINE

## 2024-07-10 PROCEDURE — 3078F DIAST BP <80 MM HG: CPT | Performed by: FAMILY MEDICINE

## 2024-07-10 PROCEDURE — 99214 OFFICE O/P EST MOD 30 MIN: CPT

## 2024-07-10 RX ORDER — DOXYCYCLINE HYCLATE 100 MG
100 TABLET ORAL 2 TIMES DAILY
Qty: 14 TABLET | Refills: 0 | Status: SHIPPED | OUTPATIENT
Start: 2024-07-10 | End: 2024-07-17

## 2024-07-10 RX ORDER — ALPRAZOLAM 0.25 MG/1
0.25 TABLET ORAL ONCE
Qty: 1 TABLET | Refills: 0 | Status: SHIPPED | OUTPATIENT
Start: 2024-07-10 | End: 2024-07-10

## 2024-07-10 NOTE — PATIENT INSTRUCTIONS
Thank you for coming into the clinic today  -As discussed we will start you on doxycycline 100 mg twice daily for a total of 7 days for acute bacterial sinusitis  -Continue using the Flonase daily  -You can add Afrin for 3 days to help with your sinus congestion and then STOP  -I would recommend starting nasal saline rinses with saline or with a Seale pot  -If Zyrtec makes you drowsy I would recommend switching to Claritin or  -I would like to see you back in the clinic in 1 week to check for improvement  -Please be sure to watch out for any chest discomfort as doxycycline can inflame and worsen ulcers  -Avoid ibuprofen when on this medication use Tylenol for pain, and stay well-hydrated  -Please feel free to send us any nonurgent medical questions through my portal or call to speak with the on-call physician for more urgent medical questions

## 2024-07-10 NOTE — TELEPHONE ENCOUNTER
Spoke with female pharmacist, xanax Rx-as entered in epic called into pt preferred pharmacy.     Patient notified.

## 2024-07-10 NOTE — PROGRESS NOTES
[facility] Medicine Residency  [address] [place] [address]  Phone: [phone]  Fax: [phone]    OFFICE VISIT       Date of Visit:  7/10/2024  Patient Name: Shey Gillette   Patient :  1955     ASSESSMENT/PLAN     1. Acute bacterial sinusitis  -     doxycycline hyclate (VIBRA-TABS) 100 MG tablet; Take 1 tablet by mouth 2 times daily for 7 days, Disp-14 tablet, R-0Normal  2. Current smoker       Acute bacterial sinusitis  -Ordered doxycycline 100 mg tablet twice daily for 7 days  -Patient allergic to penicillins  -Recommended daily Flonase, nasal saline rinses, and daily histamine with Claritin or Allegra  -Recommended Afrin for total duration of 3 days      Resident Attestation:  Personally discussed with preceptor Dr. Briones at the time of the encounter.  Salah Awadalla MD  Family Medicine, PGY 2  [address] [address] [phone]       Questions/concerns answered. Patient verbalized and expressed understanding. Medications, laboratory testing, imaging, consultation, and follow up as documented in this encounter.     Please be aware portions of this note were completed using voice recognition software and unforeseen errors may have occurred    HPI:     Shey Gillette is a 68 y.o. female who is a current smoker with a PMH of hypertension, pulmonary emphysema, GERD, cerebral meningioma, and major depression who presents today to discuss cough and congestion.    Past Medical History:   Diagnosis Date    Back pain     Chronic back pain     Chronic maxillary sinusitis     Diverticulitis of sigmoid colon     Generalized anxiety disorder 2015    GERD (gastroesophageal reflux disease)     Hyperlipidemia     Hypertension, benign 2015    Hypokalemia 2023    seen on CMP from encounter on2023    Lumbago with sciatica, right side     Major depression, chronic 2015    Osteoporosis     Peripheral polyneuropathy

## 2024-07-18 NOTE — PROGRESS NOTES
Nitrite, Urine 03/09/2024 NEGATIVE  NEGATIVE Final    Leukocyte Esterase, Urine 03/09/2024 NEGATIVE  NEGATIVE Final    WBC, UA 03/09/2024 0 TO 2  0 - 5 /HPF Final    RBC, UA 03/09/2024 0 TO 2  0 - 2 /HPF Final    Epithelial Cells, UA 03/09/2024 2 TO 5  0 - 5 /HPF Final    Bacteria, UA 03/09/2024 FEW (A)  None Final    Other Observations UA 03/09/2024  (A)  NOT REQ. Final                    Value:Utilizing a urinalysis as the only screening method to exclude a potential uropathogen can be unreliable in many patient populations.  Rapid screening tests are less sensitive than culture and if UTI is a clinical possibility, culture should be considered despite a negative urinalysis.          No results found for this visit on 07/19/24.     COMMUNICATION   Questions/concerns answered. Patient verbalized and expressed understanding. Medications, laboratory testing, imaging, consultation, and follow up as documented in this encounter.     Patient Instructions   Thank you for following up with us at [facility] outpatient residency clinic! It was a pleasure to meet you today!     Our plan is the following:  - please schedule with ENT for further evaluation  - do some of that sinus massage we did here at office   - try the nasal saline washes and warm hand towel  - schedule for OMT  -    If you have any additional questions or concerns, please call the office ([phone]) and speak to one of the staff. They will triage and forward the message to the doctors! Have a great rest of your day!        Please be aware portions of this note were completed using voice recognition software and unforeseen errors may have occurred    Patient was seen and discussed with Delaney Briones MD.  Electronically signed by Torrey Lopez MD on 7/19/2024 at 5:17 PM

## 2024-07-19 ENCOUNTER — OFFICE VISIT (OUTPATIENT)
Age: 69
End: 2024-07-19
Payer: MEDICARE

## 2024-07-19 VITALS — WEIGHT: 167.4 LBS | BODY MASS INDEX: 26.9 KG/M2 | HEIGHT: 66 IN

## 2024-07-19 DIAGNOSIS — M99.00 SOMATIC DYSFUNCTION OF HEAD REGION: ICD-10-CM

## 2024-07-19 DIAGNOSIS — J32.0 CHRONIC MAXILLARY SINUSITIS: Primary | ICD-10-CM

## 2024-07-19 PROCEDURE — 98925 OSTEOPATH MANJ 1-2 REGIONS: CPT

## 2024-07-19 PROCEDURE — 99214 OFFICE O/P EST MOD 30 MIN: CPT

## 2024-07-19 NOTE — PATIENT INSTRUCTIONS
Thank you for following up with us at [facility] outpatient residency clinic! It was a pleasure to meet you today!     Our plan is the following:  - please schedule with ENT for further evaluation  - do some of that sinus massage we did here at office   -     If you have any additional questions or concerns, please call the office ([phone]) and speak to one of the staff. They will triage and forward the message to the doctors! Have a great rest of your day!

## 2024-07-22 ENCOUNTER — HOSPITAL ENCOUNTER (OUTPATIENT)
Dept: MRI IMAGING | Age: 69
Discharge: HOME | End: 2024-07-24
Attending: NEUROLOGICAL SURGERY
Payer: MEDICARE

## 2024-07-22 DIAGNOSIS — D32.0 MENINGIOMA, CEREBRAL (HCC): ICD-10-CM

## 2024-07-22 PROCEDURE — 70551 MRI BRAIN STEM W/O DYE: CPT

## 2024-08-08 ENCOUNTER — OFFICE VISIT (OUTPATIENT)
Age: 69
End: 2024-08-08
Payer: MEDICARE

## 2024-08-08 VITALS
HEART RATE: 66 BPM | HEIGHT: 66 IN | RESPIRATION RATE: 14 BRPM | WEIGHT: 167 LBS | BODY MASS INDEX: 26.84 KG/M2 | SYSTOLIC BLOOD PRESSURE: 128 MMHG | DIASTOLIC BLOOD PRESSURE: 74 MMHG

## 2024-08-08 DIAGNOSIS — D32.0 MENINGIOMA, CEREBRAL (HCC): Primary | ICD-10-CM

## 2024-08-08 PROCEDURE — 1123F ACP DISCUSS/DSCN MKR DOCD: CPT | Performed by: NEUROLOGICAL SURGERY

## 2024-08-08 PROCEDURE — 3078F DIAST BP <80 MM HG: CPT | Performed by: NEUROLOGICAL SURGERY

## 2024-08-08 PROCEDURE — 99214 OFFICE O/P EST MOD 30 MIN: CPT | Performed by: NEUROLOGICAL SURGERY

## 2024-08-08 PROCEDURE — 3074F SYST BP LT 130 MM HG: CPT | Performed by: NEUROLOGICAL SURGERY

## 2024-08-12 DIAGNOSIS — E78.5 HYPERLIPIDEMIA WITH TARGET LDL LESS THAN 100: Chronic | ICD-10-CM

## 2024-08-13 DIAGNOSIS — I10 HYPERTENSION, BENIGN: Chronic | ICD-10-CM

## 2024-08-13 RX ORDER — LISINOPRIL 20 MG/1
20 TABLET ORAL DAILY
Qty: 100 TABLET | Refills: 2 | Status: SHIPPED | OUTPATIENT
Start: 2024-08-13

## 2024-08-13 RX ORDER — ROSUVASTATIN CALCIUM 20 MG/1
20 TABLET, COATED ORAL DAILY
Qty: 100 TABLET | Refills: 2 | Status: SHIPPED | OUTPATIENT
Start: 2024-08-13

## 2024-09-02 ENCOUNTER — HOSPITAL ENCOUNTER (EMERGENCY)
Age: 69
Discharge: HOME | End: 2024-09-02
Attending: EMERGENCY MEDICINE
Payer: MEDICARE

## 2024-09-02 ENCOUNTER — APPOINTMENT (OUTPATIENT)
Dept: CT IMAGING | Age: 69
End: 2024-09-02
Payer: MEDICARE

## 2024-09-02 VITALS
TEMPERATURE: 97.7 F | WEIGHT: 166 LBS | DIASTOLIC BLOOD PRESSURE: 74 MMHG | SYSTOLIC BLOOD PRESSURE: 162 MMHG | HEART RATE: 59 BPM | HEIGHT: 66 IN | OXYGEN SATURATION: 98 % | RESPIRATION RATE: 17 BRPM | BODY MASS INDEX: 26.68 KG/M2

## 2024-09-02 DIAGNOSIS — I10 HYPERTENSION, UNSPECIFIED TYPE: Primary | ICD-10-CM

## 2024-09-02 LAB
ANION GAP SERPL CALCULATED.3IONS-SCNC: 11 MMOL/L (ref 9–17)
BASOPHILS # BLD: 0.1 K/UL (ref 0–0.2)
BASOPHILS NFR BLD: 1 % (ref 0–2)
BUN SERPL-MCNC: 6 MG/DL (ref 8–23)
CALCIUM SERPL-MCNC: 9.6 MG/DL (ref 8.6–10.4)
CHLORIDE SERPL-SCNC: 102 MMOL/L (ref 98–107)
CO2 SERPL-SCNC: 28 MMOL/L (ref 20–31)
CREAT SERPL-MCNC: 0.7 MG/DL (ref 0.5–0.9)
D DIMER PPP FEU-MCNC: 0.41 UG/ML FEU
EOSINOPHIL # BLD: 0.1 K/UL (ref 0–0.4)
EOSINOPHILS RELATIVE PERCENT: 2 % (ref 1–4)
ERYTHROCYTE [DISTWIDTH] IN BLOOD BY AUTOMATED COUNT: 14.2 % (ref 12.5–15.4)
GFR, ESTIMATED: >90 ML/MIN/1.73M2
GLUCOSE SERPL-MCNC: 100 MG/DL (ref 70–99)
HCT VFR BLD AUTO: 39.5 % (ref 36–46)
HGB BLD-MCNC: 13.5 G/DL (ref 12–16)
LYMPHOCYTES NFR BLD: 3.2 K/UL (ref 1–4.8)
LYMPHOCYTES RELATIVE PERCENT: 34 % (ref 24–44)
MCH RBC QN AUTO: 29.2 PG (ref 26–34)
MCHC RBC AUTO-ENTMCNC: 34.2 G/DL (ref 31–37)
MCV RBC AUTO: 85.4 FL (ref 80–100)
MONOCYTES NFR BLD: 0.7 K/UL (ref 0.1–1.2)
MONOCYTES NFR BLD: 7 % (ref 2–11)
NEUTROPHILS NFR BLD: 56 % (ref 36–66)
NEUTS SEG NFR BLD: 5.4 K/UL (ref 1.8–7.7)
PLATELET # BLD AUTO: 261 K/UL (ref 140–450)
PMV BLD AUTO: 7.6 FL (ref 6–12)
POTASSIUM SERPL-SCNC: 3.3 MMOL/L (ref 3.7–5.3)
RBC # BLD AUTO: 4.63 M/UL (ref 4–5.2)
SODIUM SERPL-SCNC: 141 MMOL/L (ref 135–144)
TROPONIN I SERPL HS-MCNC: <6 NG/L (ref 0–14)
TSH SERPL DL<=0.05 MIU/L-ACNC: 2.83 UIU/ML (ref 0.3–5)
WBC OTHER # BLD: 9.5 K/UL (ref 3.5–11)

## 2024-09-02 PROCEDURE — 84439 ASSAY OF FREE THYROXINE: CPT

## 2024-09-02 PROCEDURE — 85025 COMPLETE CBC W/AUTO DIFF WBC: CPT

## 2024-09-02 PROCEDURE — 99284 EMERGENCY DEPT VISIT MOD MDM: CPT

## 2024-09-02 PROCEDURE — 84484 ASSAY OF TROPONIN QUANT: CPT

## 2024-09-02 PROCEDURE — 93005 ELECTROCARDIOGRAM TRACING: CPT | Performed by: EMERGENCY MEDICINE

## 2024-09-02 PROCEDURE — 80048 BASIC METABOLIC PNL TOTAL CA: CPT

## 2024-09-02 PROCEDURE — 6370000000 HC RX 637 (ALT 250 FOR IP): Performed by: EMERGENCY MEDICINE

## 2024-09-02 PROCEDURE — 85379 FIBRIN DEGRADATION QUANT: CPT

## 2024-09-02 PROCEDURE — 84443 ASSAY THYROID STIM HORMONE: CPT

## 2024-09-02 RX ORDER — POTASSIUM CHLORIDE 1500 MG/1
20 TABLET, EXTENDED RELEASE ORAL ONCE
Status: DISCONTINUED | OUTPATIENT
Start: 2024-09-02 | End: 2024-09-03 | Stop reason: HOSPADM

## 2024-09-02 ASSESSMENT — PAIN - FUNCTIONAL ASSESSMENT
PAIN_FUNCTIONAL_ASSESSMENT: 0-10
PAIN_FUNCTIONAL_ASSESSMENT: NONE - DENIES PAIN

## 2024-09-02 ASSESSMENT — PAIN DESCRIPTION - LOCATION: LOCATION: HEAD

## 2024-09-02 ASSESSMENT — PAIN SCALES - GENERAL: PAINLEVEL_OUTOF10: 5

## 2024-09-03 LAB
EKG ATRIAL RATE: 53 BPM
EKG P AXIS: 64 DEGREES
EKG P-R INTERVAL: 170 MS
EKG Q-T INTERVAL: 434 MS
EKG QRS DURATION: 104 MS
EKG QTC CALCULATION (BAZETT): 407 MS
EKG R AXIS: 50 DEGREES
EKG T AXIS: 55 DEGREES
EKG VENTRICULAR RATE: 53 BPM
T4 FREE SERPL-MCNC: 1.4 NG/DL (ref 0.92–1.68)

## 2024-09-03 NOTE — ED PROVIDER NOTES
[facility] Emergency Department  [address]  [facility] [address]  Phone: [phone]  Fax: [phone]      Pt Name: Shey Gillette  MRN:[id number]  Birthdate 1955  Date of evaluation: 9/2/2024      CHIEF COMPLAINT       Chief Complaint   Patient presents with    Dizziness    Hypertension     Pt arrives with co feeling \"off\" today, lightheaded, and high bp at home in the 150s/160s    Headache       HISTORY OF PRESENT ILLNESS   68-year-old female presents to the emergency department today concerned that her blood pressure is elevated.  She reports that she when she took at home this evening it was 160/90.  Reevaluation of her blood pressure several minutes later showed that it was 160/70.  She has had intermittent frontal cephalgia throughout the day.  She describes palpitations of her heart but no chest pain.  No nausea or vomiting.  No diaphoresis.  No dyspnea.  No mitigating precipitating or exacerbating factors.  She tells me that she has had similar episodes to this in the past.    REVIEW OF SYSTEMS     Review of Systems   All other systems reviewed and are negative.        PAST MEDICAL HISTORY    has a past medical history of Back pain, Chronic back pain, Chronic maxillary sinusitis, Diverticulitis of sigmoid colon, Generalized anxiety disorder, GERD (gastroesophageal reflux disease), Hyperlipidemia, Hypertension, benign, Hypokalemia, Lumbago with sciatica, right side, Major depression, chronic, Osteoporosis, Peripheral polyneuropathy, Pulmonary emphysema (HCC), Tobacco use disorder, continuous, and Vitamin D deficiency.    SURGICAL HISTORY      has a past surgical history that includes Colonoscopy (02/04/2011); Cholecystectomy (10/2006); Colonoscopy (N/A, 12/29/2017); Colonoscopy (12/29/2017); and skin biopsy (Left, 10/27/2020).    CURRENT MEDICATIONS       Current Discharge Medication List        CONTINUE these medications which have NOT CHANGED    Details   rosuvastatin

## 2024-09-14 DIAGNOSIS — J43.9 PULMONARY EMPHYSEMA, UNSPECIFIED EMPHYSEMA TYPE (HCC): ICD-10-CM

## 2024-09-17 RX ORDER — ALBUTEROL SULFATE 90 UG/1
INHALANT RESPIRATORY (INHALATION)
Qty: 25.5 G | Refills: 2 | Status: SHIPPED | OUTPATIENT
Start: 2024-09-17 | End: 2024-10-01 | Stop reason: SDUPTHER

## 2024-09-23 ENCOUNTER — TELEPHONE (OUTPATIENT)
Dept: GASTROENTEROLOGY | Age: 69
End: 2024-09-23

## 2024-09-23 ENCOUNTER — OFFICE VISIT (OUTPATIENT)
Dept: GASTROENTEROLOGY | Age: 69
End: 2024-09-23
Payer: MEDICARE

## 2024-09-23 VITALS
TEMPERATURE: 97.5 F | WEIGHT: 161 LBS | OXYGEN SATURATION: 98 % | HEART RATE: 69 BPM | BODY MASS INDEX: 25.99 KG/M2 | RESPIRATION RATE: 16 BRPM | DIASTOLIC BLOOD PRESSURE: 86 MMHG | SYSTOLIC BLOOD PRESSURE: 129 MMHG

## 2024-09-23 DIAGNOSIS — R14.0 ABDOMINAL BLOATING: ICD-10-CM

## 2024-09-23 DIAGNOSIS — K21.9 GASTROESOPHAGEAL REFLUX DISEASE WITHOUT ESOPHAGITIS: Primary | ICD-10-CM

## 2024-09-23 DIAGNOSIS — Z90.49 S/P CHOLECYSTECTOMY: ICD-10-CM

## 2024-09-23 DIAGNOSIS — R74.8 ALKALINE PHOSPHATASE ELEVATION: ICD-10-CM

## 2024-09-23 PROCEDURE — 3074F SYST BP LT 130 MM HG: CPT | Performed by: PHYSICIAN ASSISTANT

## 2024-09-23 PROCEDURE — 99204 OFFICE O/P NEW MOD 45 MIN: CPT | Performed by: PHYSICIAN ASSISTANT

## 2024-09-23 PROCEDURE — 3079F DIAST BP 80-89 MM HG: CPT | Performed by: PHYSICIAN ASSISTANT

## 2024-09-23 PROCEDURE — 1123F ACP DISCUSS/DSCN MKR DOCD: CPT | Performed by: PHYSICIAN ASSISTANT

## 2024-09-23 RX ORDER — COLESEVELAM 180 1/1
625 TABLET ORAL 2 TIMES DAILY WITH MEALS
Qty: 180 TABLET | Refills: 0 | Status: SHIPPED | OUTPATIENT
Start: 2024-09-23

## 2024-09-23 ASSESSMENT — ENCOUNTER SYMPTOMS
BLOOD IN STOOL: 0
NAUSEA: 0
ABDOMINAL DISTENTION: 1
COUGH: 0
ABDOMINAL PAIN: 0
TROUBLE SWALLOWING: 0
CHOKING: 0
ANAL BLEEDING: 0
SORE THROAT: 0
VOICE CHANGE: 0
VOMITING: 0
DIARRHEA: 0
WHEEZING: 0
CONSTIPATION: 0
RECTAL PAIN: 0

## 2024-09-24 ENCOUNTER — HOSPITAL ENCOUNTER (OUTPATIENT)
Age: 69
Setting detail: SPECIMEN
Discharge: HOME | End: 2024-09-24

## 2024-09-24 DIAGNOSIS — Z90.49 S/P CHOLECYSTECTOMY: ICD-10-CM

## 2024-09-24 DIAGNOSIS — K21.9 GASTROESOPHAGEAL REFLUX DISEASE WITHOUT ESOPHAGITIS: ICD-10-CM

## 2024-09-24 DIAGNOSIS — R14.0 ABDOMINAL BLOATING: ICD-10-CM

## 2024-09-24 DIAGNOSIS — R74.8 ALKALINE PHOSPHATASE ELEVATION: ICD-10-CM

## 2024-09-25 LAB
ANA SER QL IA: NEGATIVE
DSDNA IGG SER QL IA: <0.5 IU/ML
GLIADIN IGA SER IA-ACNC: 0.8 U/ML
GLIADIN IGG SER IA-ACNC: <0.4 U/ML
IGA SERPL-MCNC: 163 MG/DL (ref 70–400)
MITOCHONDRIA M2 IGG SER-ACNC: 0.6 U/ML (ref 0–4)
NUCLEAR IGG SER IA-RTO: 0.1 U/ML
TTG IGA SER IA-ACNC: 0.2 U/ML

## 2024-09-27 LAB — SMOOTH MUSCLE ANTIBODY: 8 UNITS (ref 0–19)

## 2024-09-28 LAB
ALK PHOS BONE SPECIFIC: 41 U/L (ref 0–55)
ALK PHOS OTHER CALC: 0 U/L
ALK PHOSPHATASE: 109 U/L (ref 40–120)
ALKALINE PHOSPHATASE LIVER FRACTION: 68 U/L (ref 0–94)

## 2024-10-01 DIAGNOSIS — K21.9 GASTROESOPHAGEAL REFLUX DISEASE, UNSPECIFIED WHETHER ESOPHAGITIS PRESENT: ICD-10-CM

## 2024-10-01 DIAGNOSIS — J43.9 PULMONARY EMPHYSEMA, UNSPECIFIED EMPHYSEMA TYPE (HCC): ICD-10-CM

## 2024-10-01 RX ORDER — PANTOPRAZOLE SODIUM 40 MG/1
40 TABLET, DELAYED RELEASE ORAL
Qty: 90 TABLET | Refills: 1 | Status: SHIPPED | OUTPATIENT
Start: 2024-10-01

## 2024-10-01 RX ORDER — ALBUTEROL SULFATE 90 UG/1
2 INHALANT RESPIRATORY (INHALATION) EVERY 4 HOURS PRN
Qty: 25.5 G | Refills: 2 | Status: SHIPPED | OUTPATIENT
Start: 2024-10-01

## 2024-11-25 RX ORDER — COLESEVELAM 180 1/1
625 TABLET ORAL 2 TIMES DAILY WITH MEALS
Qty: 180 TABLET | Refills: 3 | Status: SHIPPED | OUTPATIENT
Start: 2024-11-25

## 2024-12-09 DIAGNOSIS — K21.9 GASTROESOPHAGEAL REFLUX DISEASE, UNSPECIFIED WHETHER ESOPHAGITIS PRESENT: ICD-10-CM

## 2024-12-09 DIAGNOSIS — J43.9 PULMONARY EMPHYSEMA, UNSPECIFIED EMPHYSEMA TYPE (HCC): ICD-10-CM

## 2024-12-09 NOTE — TELEPHONE ENCOUNTER
Patient requesting refill    Patient is using mail order for her scripts so she needs a 90 day supply.

## 2024-12-10 RX ORDER — PANTOPRAZOLE SODIUM 40 MG/1
40 TABLET, DELAYED RELEASE ORAL
Qty: 90 TABLET | Refills: 0 | Status: SHIPPED | OUTPATIENT
Start: 2024-12-10

## 2024-12-10 RX ORDER — IPRATROPIUM BROMIDE AND ALBUTEROL SULFATE 2.5; .5 MG/3ML; MG/3ML
1 SOLUTION RESPIRATORY (INHALATION) EVERY 4 HOURS PRN
Qty: 360 ML | Refills: 0 | Status: SHIPPED | OUTPATIENT
Start: 2024-12-10 | End: 2025-06-08

## 2024-12-10 RX ORDER — MECLIZINE HYDROCHLORIDE 25 MG/1
25 TABLET ORAL 3 TIMES DAILY PRN
Qty: 270 TABLET | Refills: 0 | Status: SHIPPED | OUTPATIENT
Start: 2024-12-10 | End: 2025-03-10

## 2024-12-20 DIAGNOSIS — J43.9 PULMONARY EMPHYSEMA, UNSPECIFIED EMPHYSEMA TYPE (HCC): ICD-10-CM

## 2024-12-23 RX ORDER — IPRATROPIUM BROMIDE AND ALBUTEROL SULFATE 2.5; .5 MG/3ML; MG/3ML
SOLUTION RESPIRATORY (INHALATION)
Qty: 360 ML | Refills: 0 | Status: SHIPPED | OUTPATIENT
Start: 2024-12-23

## 2025-01-15 ENCOUNTER — CARE COORDINATION (OUTPATIENT)
Dept: CARE COORDINATION | Age: 70
End: 2025-01-15

## 2025-01-15 NOTE — CARE COORDINATION
Ambulatory Care Coordination Note     1/15/2025 3:16 PM     ACM outreach attempt by this ACM today to offer care management services. ACM was unable to reach the patient by telephone today;   left voice message requesting a return phone call to this ACM.     ACM: Adeola Mims RN     Care Summary Note:     PCP/Specialist follow up:   Future Appointments         Provider Specialty Dept Phone    1/27/2025 1:30 PM Nic Boles MD Gastroenterology [phone]            Follow Up:   Plan for next ACM outreach in approximately  1-3 days  to complete:  - outreach attempt to offer care management services.

## 2025-01-17 ENCOUNTER — CARE COORDINATION (OUTPATIENT)
Dept: CARE COORDINATION | Age: 70
End: 2025-01-17

## 2025-01-17 NOTE — CARE COORDINATION
Baseline  Change in sputum?: No/At Baseline      ,   General Assessment              Medications Reviewed:   Not completed during this call:      Advance Care Planning:   Not reviewed during this call     Care Planning:   Not completed during this call    PCP/Specialist follow up:   Future Appointments         Provider Specialty Dept Phone    1/27/2025 1:30 PM Nci Boles MD Gastroenterology [phone]            Follow Up:   Plan for next ACM outreach in approximately 2 weeks to complete:  - disease specific assessments  - goal progression  - education .   Patient  is agreeable to this plan.

## 2025-02-17 ENCOUNTER — CARE COORDINATION (OUTPATIENT)
Dept: CARE COORDINATION | Age: 70
End: 2025-02-17

## 2025-02-17 NOTE — CARE COORDINATION
Ambulatory Care Coordination Note     2/17/2025 11:39 AM     ACM outreach attempt by this ACM today to perform care management follow up . ACM was unable to reach the patient by telephone today;   left voice message requesting a return phone call to this ACM.     ACM: Adeola Mims RN     Care Summary Note:     PCP/Specialist follow up:       Follow Up:   Plan for next ACM outreach in approximately 1 week to complete:  - disease specific assessments  - goal progression  - education .

## 2025-02-20 ENCOUNTER — CARE COORDINATION (OUTPATIENT)
Dept: CARE COORDINATION | Age: 70
End: 2025-02-20

## 2025-02-20 NOTE — CARE COORDINATION
Ambulatory Care Coordination Note     2/20/2025 3:37 PM     patient outreach attempt by this ACM today to perform care management follow up . AC was unable to reach the patient by telephone today;   left voice message requesting a return phone call to this ACM.     Patient graduated from the High Risk Care Management program on 2/20/2025.  Care management goals have been completed. No further Ambulatory Care Manager follow up scheduled.

## 2025-02-24 DIAGNOSIS — K21.9 GASTROESOPHAGEAL REFLUX DISEASE, UNSPECIFIED WHETHER ESOPHAGITIS PRESENT: ICD-10-CM

## 2025-02-25 RX ORDER — PANTOPRAZOLE SODIUM 40 MG/1
40 TABLET, DELAYED RELEASE ORAL
Qty: 90 TABLET | Refills: 3 | Status: SHIPPED | OUTPATIENT
Start: 2025-02-25

## 2025-02-26 DIAGNOSIS — J43.9 PULMONARY EMPHYSEMA, UNSPECIFIED EMPHYSEMA TYPE (HCC): ICD-10-CM

## 2025-02-26 DIAGNOSIS — I10 HYPERTENSION, BENIGN: Chronic | ICD-10-CM

## 2025-02-26 DIAGNOSIS — E78.5 HYPERLIPIDEMIA WITH TARGET LDL LESS THAN 100: Chronic | ICD-10-CM

## 2025-02-27 RX ORDER — ROSUVASTATIN CALCIUM 20 MG/1
20 TABLET, COATED ORAL DAILY
Qty: 100 TABLET | Refills: 2 | Status: SHIPPED | OUTPATIENT
Start: 2025-02-27

## 2025-02-27 RX ORDER — LISINOPRIL 20 MG/1
20 TABLET ORAL DAILY
Qty: 100 TABLET | Refills: 2 | Status: SHIPPED | OUTPATIENT
Start: 2025-02-27

## 2025-02-27 RX ORDER — MECLIZINE HYDROCHLORIDE 25 MG/1
25 TABLET ORAL 3 TIMES DAILY PRN
Qty: 270 TABLET | Refills: 0 | Status: SHIPPED | OUTPATIENT
Start: 2025-02-27 | End: 2025-05-28

## 2025-02-27 RX ORDER — IPRATROPIUM BROMIDE AND ALBUTEROL SULFATE 2.5; .5 MG/3ML; MG/3ML
SOLUTION RESPIRATORY (INHALATION)
Qty: 360 ML | Refills: 0 | Status: SHIPPED | OUTPATIENT
Start: 2025-02-27

## 2025-02-27 RX ORDER — ALBUTEROL SULFATE 90 UG/1
2 INHALANT RESPIRATORY (INHALATION) EVERY 4 HOURS PRN
Qty: 25.5 G | Refills: 2 | Status: SHIPPED | OUTPATIENT
Start: 2025-02-27

## 2025-03-13 ENCOUNTER — PREP FOR PROCEDURE (OUTPATIENT)
Dept: GASTROENTEROLOGY | Age: 70
End: 2025-03-13

## 2025-03-13 DIAGNOSIS — Z90.49 S/P CHOLECYSTECTOMY: ICD-10-CM

## 2025-03-13 DIAGNOSIS — R14.0 ABDOMINAL BLOATING: ICD-10-CM

## 2025-03-17 DIAGNOSIS — J43.9 PULMONARY EMPHYSEMA, UNSPECIFIED EMPHYSEMA TYPE (HCC): ICD-10-CM

## 2025-03-17 RX ORDER — IPRATROPIUM BROMIDE AND ALBUTEROL SULFATE 2.5; .5 MG/3ML; MG/3ML
SOLUTION RESPIRATORY (INHALATION)
Qty: 360 ML | Refills: 0 | Status: SHIPPED | OUTPATIENT
Start: 2025-03-17

## 2025-04-03 DIAGNOSIS — J43.9 PULMONARY EMPHYSEMA, UNSPECIFIED EMPHYSEMA TYPE (HCC): ICD-10-CM

## 2025-04-04 RX ORDER — MECLIZINE HYDROCHLORIDE 25 MG/1
25 TABLET ORAL 3 TIMES DAILY PRN
Qty: 270 TABLET | Refills: 0 | Status: SHIPPED | OUTPATIENT
Start: 2025-04-04 | End: 2025-07-03

## 2025-04-04 RX ORDER — IPRATROPIUM BROMIDE AND ALBUTEROL SULFATE 2.5; .5 MG/3ML; MG/3ML
SOLUTION RESPIRATORY (INHALATION)
Qty: 360 ML | Refills: 0 | Status: SHIPPED | OUTPATIENT
Start: 2025-04-04

## 2025-04-17 NOTE — PROGRESS NOTES
[facility] Family Medicine Residency  [address] [place] [address]  Phone: [phone]  Fax: [phone]      Date of Visit: .TD  Patient Name: Shey Gillette   Patient :  1955     ASSESSMENT/PLAN   1. Acute bacterial sinusitis  The following orders have not been finalized:  -     doxycycline hyclate (VIBRA-TABS) 100 MG tablet  -continue with saline nasal spray, netti pot, flonase and zyrtec daily  - stay away from the Mucinex spray (contains same ingredients as Afrin) guided patient to take only 3 days and rest for a week before usage again to avoid rebound congestion.         Return if symptoms worsen or fail to improve.    HPI    Shey Gillette is a 69 y.o. female who presents today to discuss   Chief Complaint   Patient presents with    Other     Allergies- nose stuffy, drainage, cough  has been 1 month, tried Mucinex sinus max    Discuss -     Patient presents to the office today with concerns of her sinusitis and feeling overwhelmed about 's dementia diagnosis. PT does have hx of chronic sinusitis but for the last month patient has not been able to clear out her symptoms. Reports she has tried netti pot, nasal saline, zyrtec. Pt has been using Mucinex sinus spray which contains same component as Afrin. I educated patient that she can't take that over 3 days and needs to space it out for a week before using again. Pt is now aware and will try that. She reports having major headaches and eye pain from the pressure.     Pt reports that she is overwhelmed with her  possible diagnosis of dementia. Pt reports he continues to forget turning off the car when they reach their destination. Pt just gets out of the car and walks away. She has to constantly remind him to turn the car off. PT was interested in joining an Alzhemiers support group, requiring a physician referral. I told patient lets hold off for now. Her  will be seeing

## 2025-04-18 ENCOUNTER — OFFICE VISIT (OUTPATIENT)
Age: 70
End: 2025-04-18
Payer: MEDICARE

## 2025-04-18 VITALS
OXYGEN SATURATION: 100 % | HEART RATE: 80 BPM | BODY MASS INDEX: 27.32 KG/M2 | TEMPERATURE: 98.2 F | HEIGHT: 66 IN | RESPIRATION RATE: 16 BRPM | SYSTOLIC BLOOD PRESSURE: 149 MMHG | WEIGHT: 170 LBS | DIASTOLIC BLOOD PRESSURE: 67 MMHG

## 2025-04-18 DIAGNOSIS — B96.89 ACUTE BACTERIAL SINUSITIS: Primary | ICD-10-CM

## 2025-04-18 DIAGNOSIS — J01.90 ACUTE BACTERIAL SINUSITIS: Primary | ICD-10-CM

## 2025-04-18 PROCEDURE — 3077F SYST BP >= 140 MM HG: CPT | Performed by: FAMILY MEDICINE

## 2025-04-18 PROCEDURE — 1123F ACP DISCUSS/DSCN MKR DOCD: CPT | Performed by: FAMILY MEDICINE

## 2025-04-18 PROCEDURE — 3078F DIAST BP <80 MM HG: CPT | Performed by: FAMILY MEDICINE

## 2025-04-18 PROCEDURE — 1159F MED LIST DOCD IN RCRD: CPT | Performed by: FAMILY MEDICINE

## 2025-04-18 PROCEDURE — 99213 OFFICE O/P EST LOW 20 MIN: CPT | Performed by: FAMILY MEDICINE

## 2025-04-18 PROCEDURE — 99212 OFFICE O/P EST SF 10 MIN: CPT

## 2025-04-18 RX ORDER — DOXYCYCLINE HYCLATE 100 MG
100 TABLET ORAL 2 TIMES DAILY
Qty: 14 TABLET | Refills: 0 | Status: SHIPPED | OUTPATIENT
Start: 2025-04-18 | End: 2025-04-25

## 2025-04-18 SDOH — ECONOMIC STABILITY: FOOD INSECURITY: WITHIN THE PAST 12 MONTHS, YOU WORRIED THAT YOUR FOOD WOULD RUN OUT BEFORE YOU GOT MONEY TO BUY MORE.: NEVER TRUE

## 2025-04-18 SDOH — ECONOMIC STABILITY: FOOD INSECURITY: WITHIN THE PAST 12 MONTHS, THE FOOD YOU BOUGHT JUST DIDN'T LAST AND YOU DIDN'T HAVE MONEY TO GET MORE.: NEVER TRUE

## 2025-04-18 NOTE — PATIENT INSTRUCTIONS
Thank you for following up with us at [facility] outpatient residency clinic! It was a pleasure to meet you today!     Our plan is the following:  - please pick medication from pharmacy  - as we discussed stay away from the mucinex spray for atleast a week. Then you can do a 3 day course and wait a week again   - flonase and nasal saline spray everyday  - continue with zyrtec     Nasal congestion and sinus pressure:  Start using saline sinus rinses daily. This will help clear the mucous that is causing you to feel congested and will make your sinus pressure less intense. You can buy a kit over-the-counter for this (an example is the NettiPot). If you feel you need additional congestion relief, you can use Afrin nasal spray (Muinex spray), but do NOT use this for more than 3 days or you will have worsened congestion when you discontinue it. It is harmful to use for longer than 3 days so resist that urge.      If you have any additional questions or concerns, please call the office ([phone]) and speak to one of the staff. They will triage and forward the message to the doctors! Have a great rest of your day!

## 2025-04-29 DIAGNOSIS — J43.9 PULMONARY EMPHYSEMA, UNSPECIFIED EMPHYSEMA TYPE (HCC): ICD-10-CM

## 2025-04-29 RX ORDER — IPRATROPIUM BROMIDE AND ALBUTEROL SULFATE 2.5; .5 MG/3ML; MG/3ML
SOLUTION RESPIRATORY (INHALATION)
Qty: 360 ML | Refills: 0 | Status: SHIPPED | OUTPATIENT
Start: 2025-04-29

## 2025-04-30 ENCOUNTER — HOSPITAL ENCOUNTER (OUTPATIENT)
Dept: PREADMISSION TESTING | Age: 70
Discharge: HOME | End: 2025-05-04

## 2025-04-30 VITALS — HEIGHT: 66 IN | WEIGHT: 170 LBS | BODY MASS INDEX: 27.32 KG/M2

## 2025-04-30 NOTE — PROGRESS NOTES
Pre-op Instructions For Out-Patient Endoscopy Surgery    Medication Instructions:  Please stop herbs and any supplements now (includes vitamins and minerals).    For these medications:  Dulaglutide (Trulicity), Exenatide (Byetta and Bydureon, Liraglutide (Victoza), Lixisenatide (Adlyxin), Semaglutide (Ozempic and Rybelsus), Tirzepatide (Mounjaro, Zepbound)- Stop 1 week prior if taking weekly or 1 day prior if taking every 12 hours or daily.     Please contact your surgeon and prescribing physician for pre-op instructions for any blood thinners.    If you have inhalers/aerosol treatments at home, please use them the morning of your surgery and bring the inhalers with you to the hospital.    Please take the following medications the morning of your surgery with a sip of water:    Inhaler    Surgery Instructions:  After midnight before surgery:  Do not eat or drink anything, including water, mints, gum, and hard candy.  You may brush your teeth without swallowing.  No smoking, chewing tobacco, or street drugs.     ** Please Follow Bowel Prep instructions if given by surgeon's office**    Please shower or bathe before surgery.       Please do not wear any cologne, lotion, powder, jewelry, piercings, perfume, makeup, nail polish, hair accessories, or hair spray on the day of surgery.  Wear loose comfortable clothing.    Leave your valuables at home but bring a payment source for any after-surgery prescriptions you plan to fill at [facility].  Bring a storage case for any glasses/contacts.    An adult who is responsible for you MUST drive you home and should be with you for the first 24 hours after surgery.     The Day of Surgery:  Arrive at [facility] Surgery Entrance at the time directed by your surgeon and check in at the desk.     If you have a living will or healthcare power of , please bring a copy.    You will be taken to the pre-op holding area where you will be prepared for

## 2025-05-04 DIAGNOSIS — J43.9 PULMONARY EMPHYSEMA, UNSPECIFIED EMPHYSEMA TYPE (HCC): ICD-10-CM

## 2025-05-05 ENCOUNTER — ANESTHESIA EVENT (OUTPATIENT)
Dept: ENDOSCOPY | Age: 70
End: 2025-05-05
Payer: MEDICARE

## 2025-05-05 ENCOUNTER — TELEPHONE (OUTPATIENT)
Age: 70
End: 2025-05-05

## 2025-05-05 RX ORDER — IPRATROPIUM BROMIDE AND ALBUTEROL SULFATE 2.5; .5 MG/3ML; MG/3ML
SOLUTION RESPIRATORY (INHALATION)
Qty: 360 ML | Refills: 17 | Status: SHIPPED | OUTPATIENT
Start: 2025-05-05

## 2025-05-05 NOTE — PRE-PROCEDURE INSTRUCTIONS
No answer, left message ?                             Unable to leave message ?    When were you told to arrive at hospital ?  0945    Do you have a  ?y    Are you on any blood thinners ?       n              If yes when did you stop taking ?    Do you have your prep Rx filled and instruction ?      Nothing to eat the day before , only clear liquids.    Are you experiencing any covid symptoms ?     Do you have any infections or rash we should be aware of ?      Do you have the Hibiclens soap to use the night before and the morning of surgery ?    Nothing to eat or drink after midnight, only a sip of water to take any medication instructed to take the night before.y  Wear comfortable clothing, leave any valuables at home, remove any jewelry and body piercing . y

## 2025-05-05 NOTE — TELEPHONE ENCOUNTER
Patient is needing a new script faxed to [facility] for respiratory therapy supplies (Nebulizer/tubing/mouthpiece)  Please advise     F#: [phone]

## 2025-05-07 ENCOUNTER — ANESTHESIA (OUTPATIENT)
Dept: ENDOSCOPY | Age: 70
End: 2025-05-07
Payer: MEDICARE

## 2025-05-07 ENCOUNTER — HOSPITAL ENCOUNTER (OUTPATIENT)
Age: 70
Setting detail: OUTPATIENT SURGERY
Discharge: HOME | End: 2025-05-07
Attending: INTERNAL MEDICINE | Admitting: INTERNAL MEDICINE
Payer: MEDICARE

## 2025-05-07 VITALS
DIASTOLIC BLOOD PRESSURE: 66 MMHG | HEIGHT: 66 IN | SYSTOLIC BLOOD PRESSURE: 136 MMHG | BODY MASS INDEX: 27.32 KG/M2 | TEMPERATURE: 98.3 F | RESPIRATION RATE: 16 BRPM | OXYGEN SATURATION: 97 % | WEIGHT: 170 LBS | HEART RATE: 49 BPM

## 2025-05-07 DIAGNOSIS — K21.9 GASTROESOPHAGEAL REFLUX DISEASE WITHOUT ESOPHAGITIS: ICD-10-CM

## 2025-05-07 DIAGNOSIS — Z90.49 S/P CHOLECYSTECTOMY: ICD-10-CM

## 2025-05-07 DIAGNOSIS — R14.0 ABDOMINAL BLOATING: ICD-10-CM

## 2025-05-07 DIAGNOSIS — J43.9 PULMONARY EMPHYSEMA, UNSPECIFIED EMPHYSEMA TYPE (HCC): Primary | ICD-10-CM

## 2025-05-07 PROCEDURE — 6360000002 HC RX W HCPCS: Performed by: NURSE ANESTHETIST, CERTIFIED REGISTERED

## 2025-05-07 PROCEDURE — 2709999900 HC NON-CHARGEABLE SUPPLY: Performed by: INTERNAL MEDICINE

## 2025-05-07 PROCEDURE — 3609012400 HC EGD TRANSORAL BIOPSY SINGLE/MULTIPLE: Performed by: INTERNAL MEDICINE

## 2025-05-07 PROCEDURE — 7100000010 HC PHASE II RECOVERY - FIRST 15 MIN: Performed by: INTERNAL MEDICINE

## 2025-05-07 PROCEDURE — 43239 EGD BIOPSY SINGLE/MULTIPLE: CPT | Performed by: INTERNAL MEDICINE

## 2025-05-07 PROCEDURE — 6360000002 HC RX W HCPCS: Performed by: ANESTHESIOLOGY

## 2025-05-07 PROCEDURE — 2580000003 HC RX 258: Performed by: ANESTHESIOLOGY

## 2025-05-07 PROCEDURE — 7100000011 HC PHASE II RECOVERY - ADDTL 15 MIN: Performed by: INTERNAL MEDICINE

## 2025-05-07 PROCEDURE — 3700000000 HC ANESTHESIA ATTENDED CARE: Performed by: INTERNAL MEDICINE

## 2025-05-07 PROCEDURE — 3700000001 HC ADD 15 MINUTES (ANESTHESIA): Performed by: INTERNAL MEDICINE

## 2025-05-07 PROCEDURE — 88305 TISSUE EXAM BY PATHOLOGIST: CPT

## 2025-05-07 RX ORDER — LIDOCAINE HYDROCHLORIDE 10 MG/ML
INJECTION, SOLUTION EPIDURAL; INFILTRATION; INTRACAUDAL; PERINEURAL
Status: DISCONTINUED | OUTPATIENT
Start: 2025-05-07 | End: 2025-05-07 | Stop reason: SDUPTHER

## 2025-05-07 RX ORDER — SODIUM CHLORIDE 0.9 % (FLUSH) 0.9 %
5-40 SYRINGE (ML) INJECTION PRN
Status: DISCONTINUED | OUTPATIENT
Start: 2025-05-07 | End: 2025-05-07 | Stop reason: HOSPADM

## 2025-05-07 RX ORDER — SODIUM CHLORIDE, SODIUM LACTATE, POTASSIUM CHLORIDE, CALCIUM CHLORIDE 600; 310; 30; 20 MG/100ML; MG/100ML; MG/100ML; MG/100ML
INJECTION, SOLUTION INTRAVENOUS CONTINUOUS
Status: DISCONTINUED | OUTPATIENT
Start: 2025-05-07 | End: 2025-05-07 | Stop reason: HOSPADM

## 2025-05-07 RX ORDER — SODIUM CHLORIDE 0.9 % (FLUSH) 0.9 %
5-40 SYRINGE (ML) INJECTION EVERY 12 HOURS SCHEDULED
Status: DISCONTINUED | OUTPATIENT
Start: 2025-05-07 | End: 2025-05-07 | Stop reason: HOSPADM

## 2025-05-07 RX ORDER — SODIUM CHLORIDE 9 MG/ML
INJECTION, SOLUTION INTRAVENOUS PRN
Status: DISCONTINUED | OUTPATIENT
Start: 2025-05-07 | End: 2025-05-07 | Stop reason: HOSPADM

## 2025-05-07 RX ORDER — LIDOCAINE HYDROCHLORIDE 10 MG/ML
1 INJECTION, SOLUTION EPIDURAL; INFILTRATION; INTRACAUDAL; PERINEURAL
Status: COMPLETED | OUTPATIENT
Start: 2025-05-07 | End: 2025-05-07

## 2025-05-07 RX ORDER — NEBULIZER ACCESSORIES
1 KIT MISCELLANEOUS DAILY PRN
Qty: 1 KIT | Refills: 0 | Status: SHIPPED | OUTPATIENT
Start: 2025-05-07 | End: 2025-05-07

## 2025-05-07 RX ORDER — PROPOFOL 10 MG/ML
INJECTION, EMULSION INTRAVENOUS
Status: DISCONTINUED | OUTPATIENT
Start: 2025-05-07 | End: 2025-05-07 | Stop reason: SDUPTHER

## 2025-05-07 RX ADMIN — PROPOFOL 50 MG: 10 INJECTION, EMULSION INTRAVENOUS at 10:46

## 2025-05-07 RX ADMIN — LIDOCAINE HYDROCHLORIDE 1 ML: 10 INJECTION, SOLUTION EPIDURAL; INFILTRATION; INTRACAUDAL; PERINEURAL at 09:09

## 2025-05-07 RX ADMIN — PROPOFOL 30 MG: 10 INJECTION, EMULSION INTRAVENOUS at 10:49

## 2025-05-07 RX ADMIN — PROPOFOL 150 MG: 10 INJECTION, EMULSION INTRAVENOUS at 10:44

## 2025-05-07 RX ADMIN — LIDOCAINE HYDROCHLORIDE 50 MG: 10 INJECTION, SOLUTION EPIDURAL; INFILTRATION; INTRACAUDAL; PERINEURAL at 10:44

## 2025-05-07 RX ADMIN — PROPOFOL 30 MG: 10 INJECTION, EMULSION INTRAVENOUS at 10:48

## 2025-05-07 RX ADMIN — SODIUM CHLORIDE, POTASSIUM CHLORIDE, SODIUM LACTATE AND CALCIUM CHLORIDE: 600; 310; 30; 20 INJECTION, SOLUTION INTRAVENOUS at 09:08

## 2025-05-07 RX ADMIN — PROPOFOL 30 MG: 10 INJECTION, EMULSION INTRAVENOUS at 10:51

## 2025-05-07 ASSESSMENT — PAIN - FUNCTIONAL ASSESSMENT
PAIN_FUNCTIONAL_ASSESSMENT: 0-10
PAIN_FUNCTIONAL_ASSESSMENT: 0-10

## 2025-05-07 ASSESSMENT — ENCOUNTER SYMPTOMS
SORE THROAT: 0
SHORTNESS OF BREATH: 1
COUGH: 0
SINUS PRESSURE: 1

## 2025-05-07 ASSESSMENT — LIFESTYLE VARIABLES: SMOKING_STATUS: 1

## 2025-05-07 NOTE — ANESTHESIA PRE PROCEDURE
Department of Anesthesiology  Preprocedure Note       Name:  Shey Gillette   Age:  69 y.o.  :  1955                                          MRN:  [id number]         Date:  2025      Surgeon: Surgeon(s):  Nic Boles MD    Procedure: Procedure(s):  ESOPHAGOGASTRODUODENOSCOPY BIOPSY    Medications prior to admission:   Prior to Admission medications    Medication Sig Start Date End Date Taking? Authorizing Provider   ipratropium 0.5 mg-albuterol 2.5 mg (DUONEB) 0.5-2.5 (3) MG/3ML SOLN nebulizer solution USE 1 VIAL VIA NEBULIZER EVERY 4 HOURS AS NEEDED FOR SHORTNESS OF BREATH 25  Yes Torrey Lopez MD   Loratadine (CLARITIN PO) Take by mouth   Yes Karen Day MD   meclizine (ANTIVERT) 25 MG tablet TAKE 1 TABLET BY MOUTH 3 TIMES  DAILY AS NEEDED FOR DIZZINESS 4/4/25 7/3/25 Yes Torrey Lopez MD   rosuvastatin (CRESTOR) 20 MG tablet TAKE 1 TABLET BY MOUTH DAILY 25  Yes Torrey Lopez MD   lisinopril (PRINIVIL;ZESTRIL) 20 MG tablet TAKE 1 TABLET BY MOUTH DAILY 25  Yes Torrey Lopez MD   albuterol sulfate HFA (PROVENTIL;VENTOLIN;PROAIR) 108 (90 Base) MCG/ACT inhaler Inhale 2 puffs into the lungs every 4 hours as needed for Wheezing or Shortness of Breath 25  Yes Torrey Lopez MD   pantoprazole (PROTONIX) 40 MG tablet TAKE 1 TABLET BY MOUTH IN THE  MORNING BEFORE BREAKFAST 25  Yes Torrey Lopez MD   albuterol (PROVENTIL) (2.5 MG/3ML) 0.083% nebulizer solution Take 3 mLs by nebulization 4 times daily as needed for Wheezing or Shortness of Breath 23 Yes Pamela Wellington MD   ascorbic acid (VITAMIN C) 500 MG tablet Sometimes   Yes Karen Day MD   fluticasone (FLONASE) 50 MCG/ACT nasal spray 1 spray by Nasal route daily as needed 7/15/23  Yes Karen Day MD   vitamin D (CHOLECALCIFEROL) 25 MCG (1000 UT) TABS tablet Take 1 tablet by mouth daily   Yes Provider, Karen, MD   Nebulizers (COMPRESSOR/NEBULIZER) MISC 1 each by Does not

## 2025-05-07 NOTE — H&P
HISTORY and PHYSICAL  [facility]       NAME:  Shey Gillette  MRN: [id number]   YOB: 1955   Date: 5/7/2025   Age: 69 y.o.  Gender: female       COMPLAINT AND PRESENT HISTORY:       Shey Gillette is 69 y.o.  female, here for     Procedure(s):  ESOPHAGOGASTRODUODENOSCOPY BIOPSY    Pre-Op Diagnosis Codes:      * Gastroesophageal reflux disease without esophagitis [K21.9]     * S/P cholecystectomy [Z90.49]     * Abdominal bloating [R14.0]    Below italics is a portion of office visit note by Dr. Boles dated 09/23/24: Reviewed   HISTORY OF PRESENT ILLNESS: Ms.Shirley CODY Gillette is a 68 y.o. female , referred for evaluation of diarrhea, GERD, s/p patrizia 2006.     This is a new return  Previous pt of Dr Ball last seen 2017  Colonoscopy at that time showing diverticuli in the descending colon. Random bx taken, negative for microscopic colitis.   She had an EGD a long time ago, report not available     Today she reports severe acid reflux/heartburn  Has been taking omeprazole but states it caused her to have chest discomfort  Then switched to Nexium for 3 years which was helpful but lost its effect  Abruptly discontinued and started pepcid instead --> rebound gastritis  PCP just started on protonix last month  Endorses increased bloating, bleching, flatus for which she takes Gas-x  Worse with tomatoes, cucumbers     Has bristol type 5 stools at least once per day  No pain with BMs     Lab review showing chronically elevated alk phos     CT abd 3/8/24  IMPRESSION:  1. Mild degree of liquid stool within the colon suggestive of an acute  diarrheal illness, though without ileus or obstruction.  2. No other acute process seen in the abdomen or pelvis.     Denies fever/chills, unintentional weight loss, dysphagia/odynophagia, n/v, abd pain, diarrhea/constipation, black or bloody stools.        UPDATE  Denies abdominal pain.  Denies dysphagia.  Hx of GERD. Takes protonix  Denies nausea,

## 2025-05-07 NOTE — ANESTHESIA POSTPROCEDURE EVALUATION
Department of Anesthesiology  Postprocedure Note    Patient: Shey Gillette  MRN: [id number]  YOB: 1955  Date of evaluation: 5/7/2025    Procedure Summary       Date: 05/07/25 Room / Location: [place] [address] / [facility]    Anesthesia Start: 1039 Anesthesia Stop: 1059    Procedure: ESOPHAGOGASTRODUODENOSCOPY BIOPSY (Esophagus) Diagnosis:       Gastroesophageal reflux disease without esophagitis      S/P cholecystectomy      Abdominal bloating      (Gastroesophageal reflux disease without esophagitis [K21.9])      (S/P cholecystectomy [Z90.49])      (Abdominal bloating [R14.0])    Surgeons: Nic Boles MD Responsible Provider: Mayte Stanford MD    Anesthesia Type: general ASA Status: 2            Anesthesia Type: No value filed.    Renetta Phase I: Renetta Score: 10    Renetta Phase II: Renetta Score: 9    Anesthesia Post Evaluation    Comments: POST- ANESTHESIA EVALUATION       Pt Name: Shey Gillette  MRN: [id number]  YOB: 1955  Date of evaluation: 5/7/2025  Time:  2:21 PM      /66   Pulse (!) 49   Temp 98.3 °F (36.8 °C) (Infrared)   Resp 16   Ht 1.676 m (5' 5.98\")   Wt 77.1 kg (170 lb)   SpO2 97%   BMI 27.45 kg/m²      Consciousness Level  Awake  Cardiopulmonary Status  Stable  Pain Adequately Treated YES  Nausea / Vomiting  NO  Adequate Hydration  YES  Anesthesia Related Complications NONE      Electronically signed by Mayte Stanford MD on 5/7/2025 at 2:21 PM           No notable events documented.

## 2025-05-07 NOTE — OP NOTE
PROCEDURE NOTE    DATE OF PROCEDURE: 5/7/2025     SURGEON: Nic Boles MD  Facility: \"[facility]  ASSISTANT: None  Anesthesia: mac   PREOPERATIVE DIAGNOSIS:   GERD  Abdominal bloating    Diagnosis:    Small ulcer at the GE junction biopsied with a thin    Gastritis, nodular, diffuse, biopsies were taken    Random small bowel biopsy      POSTOPERATIVE DIAGNOSIS: As described below    OPERATION: Upper GI endoscopy with Biopsy    ANESTHESIA: Moderate Sedation     ESTIMATED BLOOD LOSS: Less than 50 ml    COMPLICATIONS: None.     SPECIMENS:  Was Obtained: As above    HISTORY: The patient is a 69 y.o. year old female with history of above preop diagnosis.  I recommended esophagogastroduodenoscopy with possible biopsy and I explained the risk, benefits, expected outcome, and alternatives to the procedure.  Risks included but are not limited to bleeding, infection, respiratory distress, hypotension, and perforation of the esophagus, stomach, or duodenum.  Patient understands and is in agreement.      The patient was counseled at length about the risks of lena Covid-19 during their perioperative period and any recovery window from their procedure.  The patient was made aware that lena Covid-19  may worsen their prognosis for recovering from their procedure  and lend to a higher morbidity and/or mortality risk.  All material risks, benefits, and reasonable alternatives including postponing the procedure were discussed. The patient does wish to proceed with the procedure at this time.         PROCEDURE: The patient was given IV conscious sedation.  The patient's SPO2 remained above 90% throughout the procedure.The gastroscope was inserted orally and advanced under direct vision through the esophagus, through the stomach, through the pylorus, and into the descending duodenum.      Post sedation note :The patient's SPO2 remained above 90% throughout the procedure.the vital signs remained stable , and no

## 2025-05-08 LAB — SURGICAL PATHOLOGY REPORT: NORMAL

## 2025-05-09 ENCOUNTER — RESULTS FOLLOW-UP (OUTPATIENT)
Dept: GASTROENTEROLOGY | Age: 70
End: 2025-05-09

## 2025-05-09 DIAGNOSIS — J43.9 PULMONARY EMPHYSEMA, UNSPECIFIED EMPHYSEMA TYPE (HCC): Primary | ICD-10-CM

## 2025-05-09 RX ORDER — NEBULIZER ACCESSORIES
1 KIT MISCELLANEOUS DAILY PRN
Qty: 1 KIT | Refills: 0 | Status: SHIPPED | OUTPATIENT
Start: 2025-05-09

## 2025-05-09 NOTE — TELEPHONE ENCOUNTER
Order has been faxed (f) [phone], Notified patient on via voicemail. [facility]

## 2025-05-21 NOTE — ASSESSMENT & PLAN NOTE
- Lipid panel values all WNL.   - No changes to Crestor dose Due to chronic kidney disease  Baseline Hgb ~7.5  Stable at baseline  No overt bleeding  Transfuse Hgb <7

## 2025-05-29 DIAGNOSIS — J43.9 PULMONARY EMPHYSEMA, UNSPECIFIED EMPHYSEMA TYPE (HCC): ICD-10-CM

## 2025-05-30 RX ORDER — ALBUTEROL SULFATE 90 UG/1
INHALANT RESPIRATORY (INHALATION)
Qty: 25.5 G | Refills: 6 | Status: SHIPPED | OUTPATIENT
Start: 2025-05-30

## 2025-06-11 ENCOUNTER — OFFICE VISIT (OUTPATIENT)
Dept: GASTROENTEROLOGY | Age: 70
End: 2025-06-11
Payer: MEDICARE

## 2025-06-11 VITALS
WEIGHT: 167 LBS | BODY MASS INDEX: 26.97 KG/M2 | DIASTOLIC BLOOD PRESSURE: 71 MMHG | SYSTOLIC BLOOD PRESSURE: 110 MMHG | HEART RATE: 75 BPM

## 2025-06-11 DIAGNOSIS — Z90.49 S/P CHOLECYSTECTOMY: ICD-10-CM

## 2025-06-11 DIAGNOSIS — R14.0 ABDOMINAL BLOATING: Primary | ICD-10-CM

## 2025-06-11 DIAGNOSIS — R74.8 ALKALINE PHOSPHATASE ELEVATION: ICD-10-CM

## 2025-06-11 DIAGNOSIS — K21.9 GASTROESOPHAGEAL REFLUX DISEASE WITHOUT ESOPHAGITIS: ICD-10-CM

## 2025-06-11 PROCEDURE — 1159F MED LIST DOCD IN RCRD: CPT | Performed by: INTERNAL MEDICINE

## 2025-06-11 PROCEDURE — 99214 OFFICE O/P EST MOD 30 MIN: CPT | Performed by: INTERNAL MEDICINE

## 2025-06-11 PROCEDURE — 3074F SYST BP LT 130 MM HG: CPT | Performed by: INTERNAL MEDICINE

## 2025-06-11 PROCEDURE — 1123F ACP DISCUSS/DSCN MKR DOCD: CPT | Performed by: INTERNAL MEDICINE

## 2025-06-11 PROCEDURE — 1126F AMNT PAIN NOTED NONE PRSNT: CPT | Performed by: INTERNAL MEDICINE

## 2025-06-11 PROCEDURE — 3078F DIAST BP <80 MM HG: CPT | Performed by: INTERNAL MEDICINE

## 2025-06-11 ASSESSMENT — ENCOUNTER SYMPTOMS
VOMITING: 0
DIARRHEA: 0
ABDOMINAL DISTENTION: 0
NAUSEA: 0
ABDOMINAL PAIN: 0
BLOOD IN STOOL: 0
CONSTIPATION: 0
SINUS PRESSURE: 1
ANAL BLEEDING: 0
RECTAL PAIN: 0

## 2025-06-11 NOTE — PROGRESS NOTES
85.4 09/02/2024     09/02/2024     09/02/2024    K 3.3 (L) 09/02/2024     09/02/2024    CO2 28 09/02/2024    BUN 6 (L) 09/02/2024    CREATININE 0.7 09/02/2024    BILITOT 0.2 (L) 03/08/2024    ALKPHOS 109 09/24/2024    AST 16 03/08/2024    ALT 6 03/08/2024    INR 0.9 07/26/2023         Lab Results   Component Value Date    RBC 4.63 09/02/2024    HGB 13.5 09/02/2024    MCV 85.4 09/02/2024    MCH 29.2 09/02/2024    MCHC 34.2 09/02/2024    RDW 14.2 09/02/2024    MPV 7.6 09/02/2024    BASOPCT 1 09/02/2024    LYMPHSABS 3.20 09/02/2024    MONOSABS 0.70 09/02/2024    NEUTROABS 5.40 09/02/2024    EOSABS 0.10 09/02/2024    BASOSABS 0.10 09/02/2024         DIAGNOSTIC TESTING:     No results found.       PHYSICAL EXAMINATION: Vital signs reviewed per the nursing documentation.     /71   Pulse 75   Wt 75.8 kg (167 lb)   BMI 26.97 kg/m²   Body mass index is 26.97 kg/m².   Physical Exam  Vitals and nursing note reviewed.   Constitutional:       General: She is not in acute distress.     Appearance: She is well-developed. She is not diaphoretic.   HENT:      Head: Normocephalic.      Mouth/Throat:      Pharynx: No oropharyngeal exudate.   Eyes:      General: No scleral icterus.     Pupils: Pupils are equal, round, and reactive to light.   Neck:      Thyroid: No thyromegaly.      Vascular: No JVD.      Trachea: No tracheal deviation.   Cardiovascular:      Rate and Rhythm: Normal rate and regular rhythm.      Heart sounds: Normal heart sounds. No murmur heard.  Pulmonary:      Effort: Pulmonary effort is normal. No respiratory distress.      Breath sounds: Normal breath sounds. No wheezing.   Abdominal:      General: Bowel sounds are normal. There is no distension.      Palpations: Abdomen is soft.      Tenderness: There is no abdominal tenderness. There is no guarding or rebound.      Comments: No ascites   Musculoskeletal:         General: Normal range of motion.      Cervical back: Normal range of

## 2025-07-29 ENCOUNTER — CARE COORDINATION (OUTPATIENT)
Dept: CARE COORDINATION | Age: 70
End: 2025-07-29

## 2025-07-29 NOTE — CARE COORDINATION
2025 2:15 PM    Attempted to reach patient for ED follow up.  This patient was received as a referral from [facility] for case management.  LPN Care Coordinator outreach attempt on behalf of [facility] today to offer care management services. LPN CC was unable to reach the patient by telephone today;   hospital follow up call within 2 business days of discharge: Yes  left voice message requesting a return phone call to this [facility].      Patient: Shey Gillette Patient : 1955 MRN: [id number]    Last Discharge Facility       Date Complaint Diagnosis Description Type Department Provider    25  Gastroesophageal reflux disease without esophagitis ... Admission (Discharged) [facility] Nic Boles MD                Noted following upcoming appointments from discharge chart review:   BS follow up appointment(s):   Future Appointments   Date Time Provider Department Center   2025  1:30 PM Nic Boles MD [facility]     Non-BS follow up appointment(s): none noted.

## 2025-07-30 ENCOUNTER — CARE COORDINATION (OUTPATIENT)
Dept: CARE COORDINATION | Age: 70
End: 2025-07-30

## 2025-07-30 NOTE — CARE COORDINATION
2025 2:07 PM    Attempted to reach patient for ED follow up.  This patient was received as a referral from [facility] for case management.  LPN Care Coordinator outreach attempt on behalf of [facility] today to offer care management services. LPN CC was unable to reach the patient by telephone today;   HIPAA compliant voicemail and mychart letter sent.       Patient: Shey Gillette Patient : 1955 MRN: [id number]    Last Discharge Facility       Date Complaint Diagnosis Description Type Department Provider    25  Gastroesophageal reflux disease without esophagitis ... Admission (Discharged) [facility] Nic Boles MD                Noted following upcoming appointments from discharge chart review:   [facility] follow up appointment(s):   Future Appointments   Date Time Provider Department Center   2025  1:30 PM Nic Boles MD [facility]     Non-[facility] follow up appointment(s): na

## 2025-08-04 ENCOUNTER — CARE COORDINATION (OUTPATIENT)
Dept: CARE COORDINATION | Age: 70
End: 2025-08-04

## 2025-08-21 ENCOUNTER — TELEPHONE (OUTPATIENT)
Age: 70
End: 2025-08-21

## 2025-08-21 DIAGNOSIS — D32.0 MENINGIOMA, CEREBRAL (HCC): Primary | ICD-10-CM

## 2025-08-28 ENCOUNTER — HOSPITAL ENCOUNTER (OUTPATIENT)
Dept: MRI IMAGING | Age: 70
Discharge: HOME | End: 2025-08-30
Attending: NEUROLOGICAL SURGERY
Payer: MEDICARE

## 2025-08-28 DIAGNOSIS — D32.0 MENINGIOMA, CEREBRAL (HCC): ICD-10-CM

## 2025-08-28 PROCEDURE — 70551 MRI BRAIN STEM W/O DYE: CPT

## (undated) DEVICE — DUP USE 393023 JELLY LUBRICATING EZ 2OZ

## (undated) DEVICE — BITEBLOCK 54FR W/ DENT RIM BLOX

## (undated) DEVICE — DEFENDO AIR WATER SUCTION AND BIOPSY VALVE KIT FOR  OLYMPUS: Brand: DEFENDO AIR/WATER/SUCTION AND BIOPSY VALVE

## (undated) DEVICE — FORCEPS BX L240CM WRK CHN 2.8MM STD CAP W/ NDL MIC MESH

## (undated) DEVICE — GLOVE SURG SZ 75 L12IN FNGR THK79MIL GRN LTX FREE

## (undated) DEVICE — AIRLIFE™ NASAL OXYGEN CANNULA CURVED, FLARED TIP, WITH 7 FEET (2.1 M) CRUSH RESISTANT TUBING, OVER-THE-EAR STYLE: Brand: AIRLIFE™

## (undated) DEVICE — GAUZE,SPONGE,4"X4",16PLY,STRL,LF,10/TRAY: Brand: MEDLINE

## (undated) DEVICE — GOWN,POLY REINFORCED,LG: Brand: MEDLINE

## (undated) DEVICE — ENDOSCOPIC KIT 1.1+ 10 FT OP4 CA DE